# Patient Record
Sex: FEMALE | Race: BLACK OR AFRICAN AMERICAN | Employment: FULL TIME | URBAN - METROPOLITAN AREA
[De-identification: names, ages, dates, MRNs, and addresses within clinical notes are randomized per-mention and may not be internally consistent; named-entity substitution may affect disease eponyms.]

---

## 2017-02-03 ENCOUNTER — HOSPITAL ENCOUNTER (EMERGENCY)
Age: 52
Discharge: HOME | End: 2017-02-04
Attending: EMERGENCY MEDICINE
Payer: COMMERCIAL

## 2017-02-03 DIAGNOSIS — M25.551 PAIN OF RIGHT HIP JOINT: ICD-10-CM
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
## 2017-02-03 DIAGNOSIS — S50.02XA CONTUSION OF LEFT ELBOW, INITIAL ENCOUNTER: ICD-10-CM

## 2017-02-03 PROCEDURE — 99284 EMERGENCY DEPT VISIT MOD MDM: CPT

## 2017-02-03 RX ORDER — ACETAMINOPHEN 325 MG/1
650 TABLET ORAL
Status: COMPLETED | OUTPATIENT
Start: 2017-02-03 | End: 2017-02-04

## 2017-02-03 NOTE — LETTER
[address] 
[facility] EMERGENCY DEPT 
[address] [place] [address] [address] [phone] 
[phone] Work/School Note Date: 2/3/2017 To Whom It May concern: 
 
Dayana Saenz was seen and treated today in the emergency room by the following provider(s): 
Attending Provider: Cresencio Archer MD.   
 
Dayana Saenz may return to work on Sunday 2/5/17 Sincerely, Blanca Del Rio RN

## 2017-02-04 ENCOUNTER — APPOINTMENT (OUTPATIENT)
Dept: GENERAL RADIOLOGY | Age: 52
End: 2017-02-04
Attending: EMERGENCY MEDICINE
Payer: COMMERCIAL

## 2017-02-04 VITALS
HEIGHT: 69 IN | RESPIRATION RATE: 18 BRPM | TEMPERATURE: 97.9 F | BODY MASS INDEX: 42.12 KG/M2 | WEIGHT: 284.39 LBS | HEART RATE: 71 BPM | SYSTOLIC BLOOD PRESSURE: 138 MMHG | OXYGEN SATURATION: 99 % | DIASTOLIC BLOOD PRESSURE: 62 MMHG

## 2017-02-04 PROCEDURE — 73080 X-RAY EXAM OF ELBOW: CPT

## 2017-02-04 PROCEDURE — 74011250637 HC RX REV CODE- 250/637: Performed by: EMERGENCY MEDICINE

## 2017-02-04 RX ADMIN — ACETAMINOPHEN 650 MG: 325 TABLET, FILM COATED ORAL at 00:10

## 2017-02-04 NOTE — ED NOTES
Patient was at work when she tripped over some wires and fell. patent is complining of left arm and leg pain

## 2017-02-04 NOTE — DISCHARGE INSTRUCTIONS
Hip Pain: Care Instructions  Your Care Instructions  Hip pain may be caused by many things, including overuse, a fall, or a twisting movement. Another cause of hip pain is arthritis. Your pain may increase when you stand up, walk, or squat. The pain may come and go or may be constant. Home treatment can help relieve hip pain, swelling, and stiffness. If your pain is ongoing, you may need more tests and treatment. Follow-up care is a key part of your treatment and safety. Be sure to make and go to all appointments, and call your doctor if you are having problems. Its also a good idea to know your test results and keep a list of the medicines you take. How can you care for yourself at home? · Take pain medicines exactly as directed. ¨ If the doctor gave you a prescription medicine for pain, take it as prescribed. ¨ If you are not taking a prescription pain medicine, ask your doctor if you can take an over-the-counter medicine. · Rest and protect your hip. Take a break from any activity, including standing or walking, that may cause pain. · Put ice or a cold pack against your hip for 10 to 20 minutes at a time. Try to do this every 1 to 2 hours for the next 3 days (when you are awake) or until the swelling goes down. Put a thin cloth between the ice and your skin. · Sleep on your healthy side with a pillow between your knees, or sleep on your back with pillows under your knees. · If there is no swelling, you can put moist heat, a heating pad, or a warm cloth on your hip. Do gentle stretching exercises to help keep your hip flexible. · Learn how to prevent falls. Have your vision and hearing checked regularly. Wear slippers or shoes with a nonskid sole. · Stay at a healthy weight. · Wear comfortable shoes. When should you call for help? Call 911 anytime you think you may need emergency care. For example, call if:  · You have sudden chest pain and shortness of breath, or you cough up blood.   · You are not able to stand or walk or bear weight. · Your buttocks, legs, or feet feel numb or tingly. · Your leg or foot is cool or pale or changes color. · You have severe pain. Call your doctor now or seek immediate medical care if:  · You have signs of infection, such as:  ¨ Increased pain, swelling, warmth, or redness in the hip area. ¨ Red streaks leading from the hip area. ¨ Pus draining from the hip area. ¨ A fever. · You have signs of a blood clot, such as:  ¨ Pain in your calf, back of the knee, thigh, or groin. ¨ Redness and swelling in your leg or groin. · You are not able to bend, straighten, or move your leg normally. · You have trouble urinating or having bowel movements. Watch closely for changes in your health, and be sure to contact your doctor if:  · You do not get better as expected. Where can you learn more? Go to http://nickie-marleny.info/. Enter M656 in the search box to learn more about \"Hip Pain: Care Instructions. \"  Current as of: May 27, 2016  Content Version: 11.1  © 4889-2638 [employer]. Care instructions adapted under license by [employer] (which disclaims liability or warranty for this information). If you have questions about a medical condition or this instruction, always ask your healthcare professional. [place] [address] any warranty or liability for your use of this information.

## 2017-02-04 NOTE — ED PROVIDER NOTES
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 She has never injured either before. She has not taken any medication or placed ice on the affected areas prior to arrival in the ED. Pt has been able to use arm and ambulate since event. She endorses fall was entirely mechanical in nature. She has not noticed any swelling in her left elbow. Past Medical History:   Diagnosis Date    Arthritis      RA    Diabetes ([address]     Hypertension     Other ill-defined conditions(799.89)      abscesses under the right arm       Past Surgical History:   Procedure Laterality Date    Hx gyn       BTL    Pr abdomen surgery proc unlisted       Lap band         Family History:   Problem Relation Age of Onset    Diabetes Mother     Cancer Father        Social History     Social History    Marital status: LEGALLY      Spouse name: N/A    Number of children: N/A    Years of education: N/A     Occupational History    Not on file. Social History Main Topics    Smoking status: Never Smoker    Smokeless tobacco: Never Used    Alcohol use 0.5 oz/week     1 Cans of beer per week      Comment: occ    Drug use: No    Sexual activity: Yes     Partners: Male     Other Topics Concern    Not on file     Social History Narrative         ALLERGIES: Review of patient's allergies indicates no known allergies. Review of Systems   Constitutional: Negative for activity change, appetite change, fatigue and fever. HENT: Negative. Negative for congestion, rhinorrhea and sore throat. Respiratory: Negative. Negative for cough, shortness of breath and wheezing. Cardiovascular: Negative. Negative for chest pain and leg swelling. Gastrointestinal: Negative. Negative for abdominal distention, abdominal pain, constipation, diarrhea, nausea and vomiting. Endocrine: Negative.     Genitourinary: Negative for difficulty urinating, dysuria, menstrual problem, vaginal bleeding and vaginal discharge. Musculoskeletal: Positive for arthralgias. Negative for back pain, gait problem, joint swelling, myalgias, neck pain and neck stiffness. Left elbow pain  Pain over right hip/lateral right leg   Skin: Negative. Negative for rash and wound. Neurological: Negative. Negative for dizziness, syncope, weakness, light-headedness, numbness and headaches. Psychiatric/Behavioral: Negative. Vitals:    02/04/17 0034 02/04/17 0045 02/04/17 0100 02/04/17 0115   BP: 145/61 131/77 132/87 138/62   Pulse:       Resp:       Temp:       SpO2: 100% 99% 99% 99%   Weight:       Height:                Physical Exam   Constitutional: She is oriented to person, place, and time. No distress. HENT:   Head: Atraumatic. Eyes: Pupils are equal, round, and reactive to light. Neck: Normal range of motion. Neck supple. Cardiovascular: Normal rate, regular rhythm, normal heart sounds and intact distal pulses. Pulmonary/Chest: Effort normal and breath sounds normal. No respiratory distress. She has no wheezes. She has no rales. She exhibits no tenderness. Abdominal: Soft. She exhibits distension. There is no tenderness. There is no rebound. Musculoskeletal: She exhibits tenderness. She exhibits no edema or deformity. ttp over left elbow, radial head, no swelling    Very mild ttp over lateral hip, right, but FROM, no pain with internal/extrenal rotation, strength equal/symmetric BLLE   Neurological: She is alert and oriented to person, place, and time. A cranial nerve deficit is present. Skin: Skin is warm. No rash noted. No erythema. Psychiatric: She has a normal mood and affect.         MDM  Number of Diagnoses or Management Options  Contusion of left elbow, initial encounter:   Fall, initial encounter:   Pain of right hip joint:   Diagnosis management comments: Fall, contusion, sprain, strain, fracture    ED Course Procedures      Assessment       LABORATORY TESTS:  No results found for this or any previous visit (from the past 12 hour(s)). IMAGING RESULTS:  XR ELBOW LT MIN 3 V   Final Result          MEDICATIONS GIVEN:  Medications   acetaminophen (TYLENOL) tablet 650 mg (650 mg Oral Given 2/4/17 0010)       IMPRESSION:  1. Fall, initial encounter    2. Contusion of left elbow, initial encounter    3. Pain of right hip joint        PLAN:  1. Discharge Medication List as of 2/4/2017  1:25 AM      CONTINUE these medications which have NOT CHANGED    Details   amLODIPine (NORVASC) 5 mg tablet TAKE ONE TABLET BY MOUTH ONCE DAILY, Normal, Disp-30 Tab, R-0      furosemide (LASIX) 20 mg tablet TAKE ONE TABLET BY MOUTH EVERY DAY, NormalNeeds to be seenDisp-30 Tab, R-0      atenolol (TENORMIN) 100 mg tablet TAKE ONE TABLET BY MOUTH ONCE DAILY ** NEED TO MAKE APPT**, NormalShe needs to be seen ASAPDisp-30 Tab, R-0      acetaminophen (TYLENOL EXTRA STRENGTH) 500 mg tablet Take 1,000 mg by mouth daily. , Historical Med      captopril-hydrochlorothiazide (CAPOZIDE) 50-25 mg per tablet Take 1 Tab by mouth daily. , Normal, Disp-90 Tab, R-2      atorvastatin (LIPITOR) 20 mg tablet TAKE 1 TABLET BY MOUTH NIGHTLY, Normal, Disp-90 Tab, R-2      saxagliptin-metformin (KOMBIGLYZE XR) 5-1,000 mg TM24 Take 1 Tab by mouth daily (with breakfast). , Normal, Disp-30 Tab, R-3      INFLIXIMAB (REMICADE IV) by IntraVENous route., Historical Med      folic acid (FOLVITE) 1 mg tablet Take  by mouth daily. , Historical Med      diclofenac EC (VOLTAREN) 75 mg EC tablet Take 75 mg by mouth daily. , Historical Med           2.    Follow-up Information     Follow up With Details Comments Contact Info    Macy Maher MD  As needed, If symptoms worsen [email or web address]  [phone]          Return to ED if worse

## 2017-02-04 NOTE — ED NOTES
Discharge paper work given to patient by Dr. Lay Hint  patients questions and concerns answered.  Patient discharged

## 2017-08-17 ENCOUNTER — HOSPITAL ENCOUNTER (EMERGENCY)
Age: 52
Discharge: HOME | End: 2017-08-18
Attending: EMERGENCY MEDICINE
Payer: COMMERCIAL

## 2017-08-17 DIAGNOSIS — J03.90 EXUDATIVE TONSILLITIS: Primary | ICD-10-CM

## 2017-08-17 PROCEDURE — 99283 EMERGENCY DEPT VISIT LOW MDM: CPT

## 2017-08-17 RX ORDER — GLIPIZIDE 10 MG/1
5 TABLET ORAL 2 TIMES DAILY
COMMUNITY

## 2017-08-17 RX ORDER — METHOTREXATE 25 MG/ML
INJECTION INTRA-ARTERIAL; INTRAMUSCULAR; INTRATHECAL; INTRAVENOUS ONCE
COMMUNITY
End: 2018-06-21

## 2017-08-17 RX ORDER — METFORMIN HYDROCHLORIDE 1000 MG/1
TABLET ORAL
COMMUNITY
End: 2017-12-18

## 2017-08-17 NOTE — LETTER
[address] 
[facility] EMERGENCY DEPT 
[address] [place] [address] [phone] [id number] [phone] Work/School Note Date: 8/17/2017 To Whom It May concern: 
 
Jacobo Briones was seen and treated today in the emergency room by the following provider(s): 
Attending Provider: Kofi Otoole MD 
Physician Assistant: ELAINE Quan. Jacobo Briones may return to work on [address]. Sincerely, ELAINE Quan

## 2017-08-18 VITALS
WEIGHT: 275.13 LBS | TEMPERATURE: 98.2 F | HEIGHT: 69 IN | DIASTOLIC BLOOD PRESSURE: 78 MMHG | SYSTOLIC BLOOD PRESSURE: 165 MMHG | RESPIRATION RATE: 18 BRPM | HEART RATE: 78 BPM | OXYGEN SATURATION: 98 % | BODY MASS INDEX: 40.75 KG/M2

## 2017-08-18 PROCEDURE — 74011250637 HC RX REV CODE- 250/637: Performed by: PHYSICIAN ASSISTANT

## 2017-08-18 RX ORDER — DEXAMETHASONE SODIUM PHOSPHATE 4 MG/ML
10 INJECTION, SOLUTION INTRA-ARTICULAR; INTRALESIONAL; INTRAMUSCULAR; INTRAVENOUS; SOFT TISSUE
Status: COMPLETED | OUTPATIENT
Start: 2017-08-18 | End: 2017-08-18

## 2017-08-18 RX ORDER — CEPHALEXIN 500 MG/1
500 CAPSULE ORAL 3 TIMES DAILY
Qty: 30 CAP | Refills: 0 | Status: SHIPPED | OUTPATIENT
Start: 2017-08-18 | End: 2017-08-28

## 2017-08-18 RX ORDER — DEXAMETHASONE SODIUM PHOSPHATE 4 MG/ML
INJECTION, SOLUTION INTRA-ARTICULAR; INTRALESIONAL; INTRAMUSCULAR; INTRAVENOUS; SOFT TISSUE
Status: DISCONTINUED
Start: 2017-08-18 | End: 2017-08-18 | Stop reason: HOSPADM

## 2017-08-18 RX ORDER — OXYCODONE AND ACETAMINOPHEN 5; 325 MG/1; MG/1
1 TABLET ORAL
Qty: 15 TAB | Refills: 0 | Status: SHIPPED | OUTPATIENT
Start: 2017-08-18 | End: 2017-12-18

## 2017-08-18 RX ORDER — CETIRIZINE HCL 10 MG
10 TABLET ORAL DAILY
Qty: 20 TAB | Refills: 0 | Status: SHIPPED | OUTPATIENT
Start: 2017-08-18 | End: 2017-12-18

## 2017-08-18 RX ADMIN — DEXAMETHASONE SODIUM PHOSPHATE 10 MG: 4 INJECTION, SOLUTION INTRAMUSCULAR; INTRAVENOUS at 01:24

## 2017-08-18 NOTE — ED PROVIDER NOTES
HPI Comments: Reinaldo Zuleta is a 46 y.o. female, pmhx significant for HTN, rheumatoid arthritis, and DM, who presents ambulatory to the ED c/o persistent sore throat and right eye pain/redness x 1 1/2 weeks. Pt notes associated pain with swallowing. Pt was evaluated at [facility] ED for same complaints. She tested negative for strep at the time. She was started on erythromycin which she reports compliance with. However, her symptoms have not improved. Pt denies nausea, vomiting, fever, and chills. PCP: Vinay Sheffield MD    Occupational Hx:  for [employer]  PSHx: Significant for bilateral tubal ligation, abdomen surgery (lap band)  Social Hx: - tobacco, + EtOH (occasionally), - Illicit Drugs    There are no other complaints, changes, or physical findings at this time. The history is provided by the patient. No  was used. Past Medical History:   Diagnosis Date    Arthritis     RA    Diabetes ([address]     Hypertension     Other ill-defined conditions     abscesses under the right arm       Past Surgical History:   Procedure Laterality Date    ABDOMEN SURGERY PROC UNLISTED      Lap band    HX GYN      BTL         Family History:   Problem Relation Age of Onset    Diabetes Mother     Cancer Father        Social History     Social History    Marital status: LEGALLY      Spouse name: N/A    Number of children: N/A    Years of education: N/A     Occupational History    Not on file. Social History Main Topics    Smoking status: Never Smoker    Smokeless tobacco: Never Used    Alcohol use 0.5 oz/week     1 Cans of beer per week      Comment: occ    Drug use: No    Sexual activity: Yes     Partners: Male     Other Topics Concern    Not on file     Social History Narrative         ALLERGIES: Review of patient's allergies indicates no known allergies. Review of Systems   Constitutional: Negative.   Negative for activity change, appetite change, chills, diaphoresis, fever and unexpected weight change. HENT: Positive for sore throat. Negative for congestion, hearing loss, rhinorrhea, sinus pressure, sneezing and trouble swallowing. Eyes: Positive for pain and redness. Negative for itching and visual disturbance. Respiratory: Negative for cough, shortness of breath and wheezing. Cardiovascular: Negative for chest pain, palpitations and leg swelling. Gastrointestinal: Negative for abdominal pain, constipation, diarrhea, nausea and vomiting. Genitourinary: Negative for dysuria. Musculoskeletal: Negative for arthralgias, gait problem and myalgias. Skin: Negative for color change, pallor, rash and wound. Neurological: Negative for tremors, weakness, light-headedness, numbness and headaches. All other systems reviewed and are negative. Vitals:    08/17/17 2352   BP: (!) 202/88   Pulse: 88   Resp: 18   Temp: 98.2 °F (36.8 °C)   SpO2: 98%   Weight: 124.8 kg (275 lb 2.2 oz)   Height: 5' 9\" (1.753 m)            Physical Exam   Constitutional: She is oriented to person, place, and time. She appears well-developed and well-nourished. No distress. HENT:   Head: Normocephalic and atraumatic. 1+ symmetrical tonsils with erythema and exudate   Eyes: Conjunctivae and EOM are normal. Pupils are equal, round, and reactive to light. Right eye exhibits no discharge. Left eye exhibits no discharge. Neck: Normal range of motion. Neck supple. Cardiovascular: Normal rate, regular rhythm and intact distal pulses. Exam reveals no gallop and no friction rub. No murmur heard. Pulmonary/Chest: Effort normal and breath sounds normal. No respiratory distress. She has no wheezes. She has no rales. She exhibits no tenderness. Abdominal: Soft. Bowel sounds are normal. She exhibits no distension and no mass. There is no tenderness. There is no rebound and no guarding. Musculoskeletal: Normal range of motion. She exhibits no edema.    Lymphadenopathy:     She has no cervical adenopathy. Neurological: She is alert and oriented to person, place, and time. No cranial nerve deficit. Coordination normal.   Skin: Skin is warm and dry. No rash noted. No erythema. Psychiatric: She has a normal mood and affect. Nursing note and vitals reviewed. MDM  Number of Diagnoses or Management Options  Exudative tonsillitis:   Diagnosis management comments:          Amount and/or Complexity of Data Reviewed  Review and summarize past medical records: yes    Patient Progress  Patient progress: stable    ED Course       Procedures    Progress Note:  1:14 AM  Discussed workup results/findings, and counseled pt regarding her diagnosis. Pt has been encouraged to follow-up as instructed. All questions have been answered, and pt conveyed understanding. Written by Geneva Vargas, ED Scribe, as dictated by Nancy Barreto. MEDICATIONS GIVEN:  Medications   dexamethasone (DECADRON) 4 mg/mL injection 10 mg (not administered)       IMPRESSION:  1. Exudative tonsillitis        PLAN:  1. Discharge home. Current Discharge Medication List      START taking these medications    Details   oxyCODONE-acetaminophen (PERCOCET) 5-325 mg per tablet Take 1 Tab by mouth every four (4) hours as needed for Pain. Max Daily Amount: 6 Tabs. Qty: 15 Tab, Refills: 0      cephALEXin (KEFLEX) 500 mg capsule Take 1 Cap by mouth three (3) times daily for 10 days. Qty: 30 Cap, Refills: 0      cetirizine (ZYRTEC) 10 mg tablet Take 1 Tab by mouth daily. Qty: 20 Tab, Refills: 0           2. Follow-up Information     Follow up With Details Comments Contact Info    ABUNDIO Iglesias MD Schedule an appointment as soon as possible for a visit As needed [address] [facility] [phone]          3. Return to ED if worse       DISCHARGE NOTE:  1:14 AM  Patient's results have been reviewed with them.  Patient and/or family have verbally conveyed their understanding and agreement of the patient's signs, symptoms, diagnosis, treatment and prognosis and additionally agree to follow up as recommended or return to the Emergency Room should their condition change prior to follow-up. Discharge instructions have also been provided to the patient with some educational information regarding their diagnosis as well a list of reasons why they would want to return to the ER prior to their follow-up appointment should their condition change. This note is prepared by Cherri Hopson, acting as Scribe for MENDOZA Gamino: The scribe's documentation has been prepared under my direction and personally reviewed by me in its entirety. I confirm that the note above accurately reflects all work, treatment, procedures, and medical decision making performed by me.

## 2017-08-18 NOTE — ED NOTES
ELAINE Cruz at bedside to provide discharge paperwork. Vital signs stable. Pt in no apparent distress at this time. Mental status at baseline. Ambulatory to waiting room with steady gate, discharge paperwork in hand. Accompanied by self.

## 2017-08-18 NOTE — DISCHARGE INSTRUCTIONS
Thank you for allowing us to provide you with care today. We hope we addressed all of your concerns and needs. We strive to provide excellent quality care in the Emergency Department. Please rate us as excellent, as anything less than excellent does not meet our expectations. If you feel that you have not received excellent quality care or timely care, please ask to speak to the nurse manager. Please choose us in the future for your continued health care needs. The exam and treatment you received in the Emergency Department were for an urgent problem and are not intended as complete care. It is important that you follow-up with a doctor, nurse practitioner, or  [id number] assistant to: (1) confirm your diagnosis, (2) re-evaluation of changes in your illness and treatment, and (3) for ongoing care. If your symptoms become worse or you do not improve as expected and you are unable to reach your usual health care provider, you should return to the Emergency Department. We are available 24 hours a day. Take this sheet with you when you go to your follow-up visit. If you have any problem arranging the follow-up visit, contact the Emergency Department immediately. Make an appointment with your Primary Care doctor for follow up of this visit. Return to the ER if you are unable to be seen in the time recommended on your discharge instructions.

## 2017-12-18 ENCOUNTER — APPOINTMENT (OUTPATIENT)
Dept: GENERAL RADIOLOGY | Age: 52
End: 2017-12-18
Attending: EMERGENCY MEDICINE
Payer: COMMERCIAL

## 2017-12-18 ENCOUNTER — HOSPITAL ENCOUNTER (EMERGENCY)
Age: 52
Discharge: HOME | End: 2017-12-18
Attending: EMERGENCY MEDICINE
Payer: COMMERCIAL

## 2017-12-18 VITALS
BODY MASS INDEX: 39.8 KG/M2 | HEART RATE: 56 BPM | OXYGEN SATURATION: 98 % | TEMPERATURE: 98.1 F | HEIGHT: 69 IN | SYSTOLIC BLOOD PRESSURE: 162 MMHG | DIASTOLIC BLOOD PRESSURE: 76 MMHG | WEIGHT: 268.74 LBS | RESPIRATION RATE: 14 BRPM

## 2017-12-18 DIAGNOSIS — R53.83 FATIGUE, UNSPECIFIED TYPE: Primary | ICD-10-CM

## 2017-12-18 DIAGNOSIS — R55 SYNCOPE AND COLLAPSE: ICD-10-CM

## 2017-12-18 LAB
ALBUMIN SERPL-MCNC: 3.5 G/DL (ref 3.5–5)
ALBUMIN/GLOB SERPL: 1 {RATIO} (ref 1.1–2.2)
ALP SERPL-CCNC: 65 U/L (ref 45–117)
ALT SERPL-CCNC: 23 U/L (ref 12–78)
ANION GAP SERPL CALC-SCNC: 8 MMOL/L (ref 5–15)
APPEARANCE UR: ABNORMAL
AST SERPL-CCNC: 23 U/L (ref 15–37)
ATRIAL RATE: 63 BPM
BACTERIA URNS QL MICRO: ABNORMAL /HPF
BASOPHILS # BLD: 0 K/UL (ref 0–0.1)
BASOPHILS NFR BLD: 1 % (ref 0–1)
BILIRUB SERPL-MCNC: 0.8 MG/DL (ref 0.2–1)
BILIRUB UR QL: NEGATIVE
BUN SERPL-MCNC: 15 MG/DL (ref 6–20)
BUN/CREAT SERPL: 9 (ref 12–20)
CALCIUM SERPL-MCNC: 8.4 MG/DL (ref 8.5–10.1)
CALCULATED P AXIS, ECG09: 51 DEGREES
CALCULATED R AXIS, ECG10: -14 DEGREES
CALCULATED T AXIS, ECG11: 54 DEGREES
CHLORIDE SERPL-SCNC: 106 MMOL/L (ref 97–108)
CK SERPL-CCNC: 110 U/L (ref 26–192)
CO2 SERPL-SCNC: 26 MMOL/L (ref 21–32)
COLOR UR: ABNORMAL
CREAT SERPL-MCNC: 1.66 MG/DL (ref 0.55–1.02)
DIAGNOSIS, 93000: NORMAL
EOSINOPHIL # BLD: 0.1 K/UL (ref 0–0.4)
EOSINOPHIL NFR BLD: 2 % (ref 0–7)
EPITH CASTS URNS QL MICRO: ABNORMAL /LPF
ERYTHROCYTE [DISTWIDTH] IN BLOOD BY AUTOMATED COUNT: 15.6 % (ref 11.5–14.5)
GLOBULIN SER CALC-MCNC: 3.6 G/DL (ref 2–4)
GLUCOSE SERPL-MCNC: 129 MG/DL (ref 65–100)
GLUCOSE UR STRIP.AUTO-MCNC: NEGATIVE MG/DL
HCT VFR BLD AUTO: 29 % (ref 35–47)
HGB BLD-MCNC: 9.6 G/DL (ref 11.5–16)
HGB UR QL STRIP: NEGATIVE
KETONES UR QL STRIP.AUTO: NEGATIVE MG/DL
LEUKOCYTE ESTERASE UR QL STRIP.AUTO: NEGATIVE
LYMPHOCYTES # BLD: 1.3 K/UL (ref 0.8–3.5)
LYMPHOCYTES NFR BLD: 29 % (ref 12–49)
MCH RBC QN AUTO: 31.5 PG (ref 26–34)
MCHC RBC AUTO-ENTMCNC: 33.1 G/DL (ref 30–36.5)
MCV RBC AUTO: 95.1 FL (ref 80–99)
MONOCYTES # BLD: 0.8 K/UL (ref 0–1)
MONOCYTES NFR BLD: 18 % (ref 5–13)
NEUTS SEG # BLD: 2.3 K/UL (ref 1.8–8)
NEUTS SEG NFR BLD: 50 % (ref 32–75)
NITRITE UR QL STRIP.AUTO: NEGATIVE
P-R INTERVAL, ECG05: 164 MS
PH UR STRIP: 6 [PH] (ref 5–8)
PLATELET # BLD AUTO: 223 K/UL (ref 150–400)
POTASSIUM SERPL-SCNC: 3.4 MMOL/L (ref 3.5–5.1)
PROT SERPL-MCNC: 7.1 G/DL (ref 6.4–8.2)
PROT UR STRIP-MCNC: 30 MG/DL
Q-T INTERVAL, ECG07: 414 MS
QRS DURATION, ECG06: 90 MS
QTC CALCULATION (BEZET), ECG08: 423 MS
RBC # BLD AUTO: 3.05 M/UL (ref 3.8–5.2)
RBC #/AREA URNS HPF: ABNORMAL /HPF (ref 0–5)
RBC MORPH BLD: ABNORMAL
SODIUM SERPL-SCNC: 140 MMOL/L (ref 136–145)
SP GR UR REFRACTOMETRY: 1.01 (ref 1–1.03)
TROPONIN I SERPL-MCNC: <0.04 NG/ML
UA: UC IF INDICATED,UAUC: ABNORMAL
UROBILINOGEN UR QL STRIP.AUTO: 0.2 EU/DL (ref 0.2–1)
VENTRICULAR RATE, ECG03: 63 BPM
WBC # BLD AUTO: 4.5 K/UL (ref 3.6–11)
WBC MORPH BLD: ABNORMAL
WBC URNS QL MICRO: ABNORMAL /HPF (ref 0–4)

## 2017-12-18 PROCEDURE — 93005 ELECTROCARDIOGRAM TRACING: CPT

## 2017-12-18 PROCEDURE — 84484 ASSAY OF TROPONIN QUANT: CPT | Performed by: EMERGENCY MEDICINE

## 2017-12-18 PROCEDURE — 85025 COMPLETE CBC W/AUTO DIFF WBC: CPT | Performed by: EMERGENCY MEDICINE

## 2017-12-18 PROCEDURE — 87086 URINE CULTURE/COLONY COUNT: CPT | Performed by: EMERGENCY MEDICINE

## 2017-12-18 PROCEDURE — 81001 URINALYSIS AUTO W/SCOPE: CPT | Performed by: EMERGENCY MEDICINE

## 2017-12-18 PROCEDURE — 80053 COMPREHEN METABOLIC PANEL: CPT | Performed by: EMERGENCY MEDICINE

## 2017-12-18 PROCEDURE — 99284 EMERGENCY DEPT VISIT MOD MDM: CPT

## 2017-12-18 PROCEDURE — 36415 COLL VENOUS BLD VENIPUNCTURE: CPT | Performed by: EMERGENCY MEDICINE

## 2017-12-18 PROCEDURE — 82550 ASSAY OF CK (CPK): CPT | Performed by: EMERGENCY MEDICINE

## 2017-12-18 PROCEDURE — 71020 XR CHEST PA LAT: CPT

## 2017-12-18 RX ORDER — LOSARTAN POTASSIUM AND HYDROCHLOROTHIAZIDE 25; 100 MG/1; MG/1
1 TABLET ORAL DAILY
COMMUNITY
End: 2019-06-23

## 2017-12-18 NOTE — ED NOTES
Ambulated pt around ED. Pt tolerated well. Ambulated with steady gait and denies cp/sob/dizziness. Asked pt if she has been under increased stress recently. Pt became immediately tearful, stating that she and her  have . Pt states that she has loss of appetite and has not been sleeping well. Reports that she does have a support system. Sister and daughter are very supportive.

## 2017-12-18 NOTE — ED PROVIDER NOTES
(VOLTAREN) 75 mg EC tablet Take 50 mg by mouth daily.  INFLIXIMAB (REMICADE IV) by IntraVENous route.  methotrexate, PF, 25 mg/mL injection once.  acetaminophen (TYLENOL EXTRA STRENGTH) 500 mg tablet Take 1,000 mg by mouth daily. Past History     Past Medical History:  Past Medical History:   Diagnosis Date    Arthritis     RA    Diabetes ([address]     Hypertension     Other ill-defined conditions(799.89)     abscesses under the right arm       Past Surgical History:  Past Surgical History:   Procedure Laterality Date    ABDOMEN SURGERY PROC UNLISTED      Lap band    HX GYN      BTL       Family History:  Family History   Problem Relation Age of Onset    Diabetes Mother     Cancer Father        Social History:  Social History   Substance Use Topics    Smoking status: Never Smoker    Smokeless tobacco: Never Used    Alcohol use 0.5 oz/week     1 Cans of beer per week      Comment: occ       Allergies:  No Known Allergies      Review of Systems   Review of Systems   Constitutional: Positive for appetite change (decreased), chills and diaphoresis. Negative for fatigue and fever. HENT: Negative for congestion, rhinorrhea and sore throat. Eyes: Negative for pain, discharge and visual disturbance. Respiratory: Positive for cough (with green sputum). Negative for chest tightness, shortness of breath and wheezing. Cardiovascular: Negative for chest pain, palpitations and leg swelling. Gastrointestinal: Negative for abdominal pain, constipation, diarrhea, nausea and vomiting. Genitourinary: Negative for dysuria, frequency and hematuria. Musculoskeletal: Negative for arthralgias, back pain and myalgias. Skin: Negative for rash. Neurological: Positive for syncope and weakness. Negative for dizziness, light-headedness and headaches. Psychiatric/Behavioral: Negative. Physical Exam   Physical Exam   Constitutional: She is oriented to person, place, and time.  She appears well-developed and well-nourished. No distress. HENT:   Head: Normocephalic and atraumatic. Eyes: EOM are normal. Right eye exhibits no discharge. Left eye exhibits no discharge. No scleral icterus. Neck: Normal range of motion. Neck supple. No tracheal deviation present. Cardiovascular: Normal rate, regular rhythm, normal heart sounds and intact distal pulses. Exam reveals no gallop and no friction rub. No murmur heard. Pulmonary/Chest: Effort normal and breath sounds normal. No respiratory distress. She has no wheezes. She has no rales. Abdominal: Soft. She exhibits no distension. There is no tenderness. Musculoskeletal: Normal range of motion. She exhibits no edema. Lymphadenopathy:     She has no cervical adenopathy. Neurological: She is alert and oriented to person, place, and time. No focal neuro deficits   Skin: Skin is warm and dry. No rash noted. Psychiatric: She has a normal mood and affect. Nursing note and vitals reviewed.         Diagnostic Study Results     Labs -     Recent Results (from the past 12 hour(s))   EKG, 12 LEAD, INITIAL    Collection Time: 12/18/17 11:07 AM   Result Value Ref Range    Ventricular Rate 63 BPM    Atrial Rate 63 BPM    P-R Interval 164 ms    QRS Duration 90 ms    Q-T Interval 414 ms    QTC Calculation (Bezet) 423 ms    Calculated P Axis 51 degrees    Calculated R Axis -14 degrees    Calculated T Axis 54 degrees    Diagnosis       Normal sinus rhythm  Low voltage QRS  Cannot rule out Anterior infarct , age undetermined     METABOLIC PANEL, COMPREHENSIVE    Collection Time: 12/18/17 11:17 AM   Result Value Ref Range    Sodium 140 136 - 145 mmol/L    Potassium 3.4 (L) 3.5 - 5.1 mmol/L    Chloride 106 97 - 108 mmol/L    CO2 26 21 - 32 mmol/L    Anion gap 8 5 - 15 mmol/L    Glucose 129 (H) 65 - 100 mg/dL    BUN 15 6 - 20 MG/DL    Creatinine 1.66 (H) 0.55 - 1.02 MG/DL    BUN/Creatinine ratio 9 (L) 12 - 20      GFR est AA 39 (L) >60 ml/min/1.73m2    GFR est non-AA 32 (L) >60 ml/min/1.73m2    Calcium 8.4 (L) 8.5 - 10.1 MG/DL    Bilirubin, total 0.8 0.2 - 1.0 MG/DL    ALT (SGPT) 23 12 - 78 U/L    AST (SGOT) 23 15 - 37 U/L    Alk. phosphatase 65 45 - 117 U/L    Protein, total 7.1 6.4 - 8.2 g/dL    Albumin 3.5 3.5 - 5.0 g/dL    Globulin 3.6 2.0 - 4.0 g/dL    A-G Ratio 1.0 (L) 1.1 - 2.2     CBC WITH AUTOMATED DIFF    Collection Time: 12/18/17 11:17 AM   Result Value Ref Range    WBC 4.5 3.6 - 11.0 K/uL    RBC 3.05 (L) 3.80 - 5.20 M/uL    HGB 9.6 (L) 11.5 - 16.0 g/dL    HCT 29.0 (L) 35.0 - 47.0 %    MCV 95.1 80.0 - 99.0 FL    MCH 31.5 26.0 - 34.0 PG    MCHC 33.1 30.0 - 36.5 g/dL    RDW 15.6 (H) 11.5 - 14.5 %    PLATELET 380 785 - 262 K/uL    NEUTROPHILS 50 32 - 75 %    LYMPHOCYTES 29 12 - 49 %    MONOCYTES 18 (H) 5 - 13 %    EOSINOPHILS 2 0 - 7 %    BASOPHILS 1 0 - 1 %    ABS. NEUTROPHILS 2.3 1.8 - 8.0 K/UL    ABS. LYMPHOCYTES 1.3 0.8 - 3.5 K/UL    ABS. MONOCYTES 0.8 0.0 - 1.0 K/UL    ABS. EOSINOPHILS 0.1 0.0 - 0.4 K/UL    ABS.  BASOPHILS 0.0 0.0 - 0.1 K/UL    RBC COMMENTS NORMOCYTIC, NORMOCHROMIC      WBC COMMENTS REACTIVE LYMPHS     TROPONIN I    Collection Time: 12/18/17 11:17 AM   Result Value Ref Range    Troponin-I, Qt. <0.04 <0.05 ng/mL   CK W/ REFLX CKMB    Collection Time: 12/18/17 11:17 AM   Result Value Ref Range     26 - 192 U/L   URINALYSIS W/ REFLEX CULTURE    Collection Time: 12/18/17 12:27 PM   Result Value Ref Range    Color YELLOW/STRAW      Appearance CLOUDY (A) CLEAR      Specific gravity 1.014 1.003 - 1.030      pH (UA) 6.0 5.0 - 8.0      Protein 30 (A) NEG mg/dL    Glucose NEGATIVE  NEG mg/dL    Ketone NEGATIVE  NEG mg/dL    Bilirubin NEGATIVE  NEG      Blood NEGATIVE  NEG      Urobilinogen 0.2 0.2 - 1.0 EU/dL    Nitrites NEGATIVE  NEG      Leukocyte Esterase NEGATIVE  NEG      WBC 0-4 0 - 4 /hpf    RBC 0-5 0 - 5 /hpf    Epithelial cells MODERATE (A) FEW /lpf    Bacteria 1+ (A) NEG /hpf    UA:UC IF INDICATED URINE CULTURE ORDERED (A) CNI Radiologic Studies -     CXR Results  (Last 48 hours)               12/18/17 1406  XR CHEST PA LAT Final result    Impression:  IMPRESSION: No acute cardiopulmonary disease. Narrative: Indication:  syncope        Exam: PA and lateral views of the chest.       Direct comparison is made to prior CXR dated 1/23/2014. Findings: Cardiomediastinal silhouette is within normal limits. Lungs are clear   bilaterally. Pleural spaces are normal. Osseous structures are intact. Medical Decision Making   I am the first provider for this patient. I reviewed the vital signs, available nursing notes, past medical history, past surgical history, family history and social history. Vital Signs-Reviewed the patient's vital signs. Patient Vitals for the past 12 hrs:   Temp Pulse Resp BP SpO2   12/18/17 1315 - (!) 56 14 162/76 98 %   12/18/17 1230 - (!) 59 21 152/72 97 %   12/18/17 1100 98.1 °F (36.7 °C) 65 18 167/73 96 %       Pulse Oximetry Analysis - 97% on RA    Cardiac Monitor:   Rate: 65 bpm  Rhythm: Normal Sinus Rhythm     EKG interpretation: (Preliminary) 11:07 AM  Rhythm: normal sinus rhythm; and regular . Rate (approx.): 63; Axis: normal; AR interval: normal; QRS interval: normal ; ST/T wave: normal; Other findings: normal.  Written by Kenroy Irby ED Scribe, as dictated by Jose Dejesus MD.    Records Reviewed: Nursing Notes    Provider Notes (Medical Decision Making):   Patient presents to ED with generalized fatigue and syncopal event on 12/16 (no associated head injury). She denies chest pain, dyspnea, headache. Suspect dehydration/vasovagal syncope as patient reports recent decreased po intake. Differential includes dehydration, orthostatic hypotension, vasovagal syncope, electrolyte abnormality, REBECCA, UTI. Overall low suspicion for ACS, arrhythmia. No s/s to suggest PE. Will check labs and CXR. IVF and reevaluate. ED Course:   Initial assessment performed. The patients presenting problems have been discussed, and they are in agreement with the care plan formulated and outlined with them. I have encouraged them to ask questions as they arise throughout their visit. PROGRESS NOTE:  3:02 PM  Pt says she is going through a divorce and hasnt been eating or drinking normally and has been stressed. She denies SI or HI. Pt ambulated without difficulty/symptoms and is comfortable with being discharged. We discussed labs, including creatinine. Advised follow up with PCP for re-eval.  Discussed results, prescriptions and follow up plan with patient. Provided customary return to ED instructions. Patient expressed understanding. Rowan Garcia MD    Disposition:  DISCHARGE NOTE:  3:59 PM  Pt has been reexamined. Pt has no new complaints, changes, or physical findings. Care plan outlined and precautions discussed. All available results reviewed with pt. All medications reviewed with pt. All of pts questions and concerns addressed. Pt agrees to f/u as instructed and agrees to return to ED upon further deterioration. Pt is ready to go home. Written by Aliya Zhang ED Scribe as dictated by Adis Chiu MD    PLAN:  1. Discharge Medication List as of 12/18/2017  3:03 PM        2. Follow-up Information     Follow up With Details Comments Contact Info    ABUNDIO Iglesias MD  Please follow up with primary care provider this week to reevaluate kidney function (creatinine 1.66) [address] [facility] [address],  In 1 week Please follow up for evaluation of passing out [address]  [address]  [facility]  [phone]      \[facility] [place] [facility]\"" EMERGENCY DEPT  As needed, If symptoms worsen [address] [place] [address]  [phone]        Return to ED if worse     Diagnosis     Clinical Impression:   1. Fatigue, unspecified type    2.  Syncope and collapse Attestations:    Attestation: This note is prepared by Maria D Hicks, acting as Scribe for MD Keenan Juarez MD: The scribe's documentation has been prepared under my direction and personally reviewed by me in its entirety. I confirm that the note above accurately reflects all work, treatment, procedures, and medical decision making performed by me.

## 2017-12-18 NOTE — LETTER
[address] 
[facility] EMERGENCY DEPT 
[address] [facility] [address] [id number] [phone] Work/School Note Date: 12/18/2017 To Whom It May concern: 
 
Ashlie Leavitt was seen and treated today in the emergency room by the following provider(s): 
Attending Provider: Garett Rocha MD.   
 
Ashlie Leavitt may return to work on 12/21/2017.  
 
Sincerely, 
 
 
 
 
Garett Rocha MD

## 2017-12-18 NOTE — DISCHARGE INSTRUCTIONS
Fainting: Care Instructions  Your Care Instructions    When you faint, or pass out, you lose consciousness for a short time. A brief drop in blood flow to the brain often causes it. When you fall or lie down, more blood flows to your brain and you regain consciousness. Emotional stress, pain, or overheating-especially if you have been standing-can make you faint. In these cases, fainting is usually not serious. But fainting can be a sign of a more serious problem. Your doctor may want you to have more tests to rule out other causes. The treatment you need depends on the reason why you fainted. The doctor has checked you carefully, but problems can develop later. If you notice any problems or new symptoms, get medical treatment right away. Follow-up care is a key part of your treatment and safety. Be sure to make and go to all appointments, and call your doctor if you are having problems. It's also a good idea to know your test results and keep a list of the medicines you take. How can you care for yourself at home? · Drink plenty of fluids to prevent dehydration. If you have kidney, heart, or liver disease and have to limit fluids, talk with your doctor before you increase your fluid intake. When should you call for help? Call 911 anytime you think you may need emergency care. For example, call if:  ? · You have symptoms of a heart problem. These may include:  ¨ Chest pain or pressure. ¨ Severe trouble breathing. ¨ A fast or irregular heartbeat. ¨ Lightheadedness or sudden weakness. ¨ Coughing up pink, foamy mucus. ¨ Passing out. After you call 911, the  may tell you to chew 1 adult-strength or 2 to 4 low-dose aspirin. Wait for an ambulance. Do not try to drive yourself. ? · You have symptoms of a stroke. These may include:  ¨ Sudden numbness, tingling, weakness, or loss of movement in your face, arm, or leg, especially on only one side of your body. ¨ Sudden vision changes.   ¨ Sudden trouble speaking. ¨ Sudden confusion or trouble understanding simple statements. ¨ Sudden problems with walking or balance. ¨ A sudden, severe headache that is different from past headaches. ? · You passed out (lost consciousness) again. ? Watch closely for changes in your health, and be sure to contact your doctor if:  ? · You do not get better as expected. Where can you learn more? Go to http://nickie-marleny.info/. Enter L077 in the search box to learn more about \"Fainting: Care Instructions. \"  Current as of: March 20, 2017  Content Version: 11.4  © 3247-8615 [employer]. Care instructions adapted under license by [employer] (which disclaims liability or warranty for this information). If you have questions about a medical condition or this instruction, always ask your healthcare professional. [address] any warranty or liability for your use of this information. Fatigue: Care Instructions  Your Care Instructions    Fatigue is a feeling of tiredness, exhaustion, or lack of energy. You may feel fatigue because of too much or not enough activity. It can also come from stress, lack of sleep, boredom, and poor diet. Many medical problems, such as viral infections, can cause fatigue. Emotional problems, especially depression, are often the cause of fatigue. Fatigue is most often a symptom of another problem. Treatment for fatigue depends on the cause. For example, if you have fatigue because you have a certain health problem, treating this problem also treats your fatigue. If depression or anxiety is the cause, treatment may help. Follow-up care is a key part of your treatment and safety. Be sure to make and go to all appointments, and call your doctor if you are having problems. It's also a good idea to know your test results and keep a list of the medicines you take. How can you care for yourself at home? · Get regular exercise. But don't overdo it. Go back and forth between rest and exercise. · Get plenty of rest.  · Eat a healthy diet. Do not skip meals, especially breakfast.  · Reduce your use of caffeine, tobacco, and alcohol. Caffeine is most often found in coffee, tea, cola drinks, and chocolate. · Limit medicines that can cause fatigue. This includes tranquilizers and cold and allergy medicines. When should you call for help? Watch closely for changes in your health, and be sure to contact your doctor if:  ? · You have new symptoms such as fever or a rash. ? · Your fatigue gets worse. ? · You have been feeling down, depressed, or hopeless. Or you may have lost interest in things that you usually enjoy. ? · You are not getting better as expected. Where can you learn more? Go to http://nickie-marleny.info/. Enter Z070 in the search box to learn more about \"Fatigue: Care Instructions. \"  Current as of: March 20, 2017  Content Version: 11.4  © 8568-8760 [employer]. Care instructions adapted under license by [employer] (which disclaims liability or warranty for this information). If you have questions about a medical condition or this instruction, always ask your healthcare professional. [address] any warranty or liability for your use of this information.

## 2017-12-19 LAB
BACTERIA SPEC CULT: NORMAL
CC UR VC: NORMAL
SERVICE CMNT-IMP: NORMAL

## 2018-06-06 ENCOUNTER — HOSPITAL ENCOUNTER (EMERGENCY)
Age: 53
Discharge: HOME | End: 2018-06-06
Attending: EMERGENCY MEDICINE
Payer: COMMERCIAL

## 2018-06-06 VITALS
WEIGHT: 260 LBS | HEIGHT: 69 IN | OXYGEN SATURATION: 98 % | DIASTOLIC BLOOD PRESSURE: 56 MMHG | TEMPERATURE: 98.2 F | SYSTOLIC BLOOD PRESSURE: 175 MMHG | HEART RATE: 68 BPM | BODY MASS INDEX: 38.51 KG/M2 | RESPIRATION RATE: 16 BRPM

## 2018-06-06 DIAGNOSIS — M54.32 LEFT SCIATIC NERVE PAIN: Primary | ICD-10-CM

## 2018-06-06 PROCEDURE — 96372 THER/PROPH/DIAG INJ SC/IM: CPT

## 2018-06-06 PROCEDURE — 99283 EMERGENCY DEPT VISIT LOW MDM: CPT

## 2018-06-06 PROCEDURE — 74011250636 HC RX REV CODE- 250/636: Performed by: EMERGENCY MEDICINE

## 2018-06-06 PROCEDURE — 74011250637 HC RX REV CODE- 250/637: Performed by: EMERGENCY MEDICINE

## 2018-06-06 RX ORDER — METHYLPREDNISOLONE 4 MG/1
TABLET ORAL
Qty: 1 DOSE PACK | Refills: 0 | Status: SHIPPED | OUTPATIENT
Start: 2018-06-06 | End: 2018-06-21

## 2018-06-06 RX ORDER — LIDOCAINE 4 G/100G
1 PATCH TOPICAL EVERY 12 HOURS
Qty: 5 PATCH | Refills: 0 | Status: SHIPPED | OUTPATIENT
Start: 2018-06-06 | End: 2018-06-21

## 2018-06-06 RX ORDER — CYCLOBENZAPRINE HCL 5 MG
5 TABLET ORAL
Qty: 20 TAB | Refills: 0 | Status: SHIPPED | OUTPATIENT
Start: 2018-06-06 | End: 2019-04-10

## 2018-06-06 RX ORDER — CYCLOBENZAPRINE HCL 10 MG
10 TABLET ORAL
Status: COMPLETED | OUTPATIENT
Start: 2018-06-06 | End: 2018-06-06

## 2018-06-06 RX ORDER — CYCLOBENZAPRINE HCL 5 MG
5 TABLET ORAL
Qty: 20 TAB | Refills: 0 | Status: SHIPPED | OUTPATIENT
Start: 2018-06-06 | End: 2018-06-06

## 2018-06-06 RX ORDER — METHYLPREDNISOLONE 4 MG/1
TABLET ORAL
Qty: 1 DOSE PACK | Refills: 0 | Status: SHIPPED | OUTPATIENT
Start: 2018-06-06 | End: 2018-06-06

## 2018-06-06 RX ORDER — TRAMADOL HYDROCHLORIDE 50 MG/1
50 TABLET ORAL
COMMUNITY
End: 2018-07-04

## 2018-06-06 RX ORDER — LIDOCAINE 4 G/100G
1 PATCH TOPICAL EVERY 12 HOURS
Qty: 5 PATCH | Refills: 0 | Status: SHIPPED | OUTPATIENT
Start: 2018-06-06 | End: 2018-06-06

## 2018-06-06 RX ORDER — LIDOCAINE 50 MG/G
1 PATCH TOPICAL EVERY 24 HOURS
Status: DISCONTINUED | OUTPATIENT
Start: 2018-06-06 | End: 2018-06-06 | Stop reason: HOSPADM

## 2018-06-06 RX ORDER — PREDNISONE 10 MG/1
10 TABLET ORAL
COMMUNITY
End: 2018-07-04

## 2018-06-06 RX ORDER — MORPHINE SULFATE 10 MG/ML
6 INJECTION, SOLUTION INTRAMUSCULAR; INTRAVENOUS
Status: COMPLETED | OUTPATIENT
Start: 2018-06-06 | End: 2018-06-06

## 2018-06-06 RX ADMIN — MORPHINE SULFATE 6 MG: 10 INJECTION INTRAVENOUS at 08:09

## 2018-06-06 RX ADMIN — CYCLOBENZAPRINE HYDROCHLORIDE 10 MG: 10 TABLET, FILM COATED ORAL at 07:16

## 2018-06-06 RX ADMIN — METHYLPREDNISOLONE SODIUM SUCCINATE 125 MG: 125 INJECTION, POWDER, FOR SOLUTION INTRAMUSCULAR; INTRAVENOUS at 07:16

## 2018-06-06 NOTE — ED NOTES
0715: Assumed care of patient. Bedside report received from Karla Dumont. Pt currently resting in position of comfort. 0815: Pt states her family members are en route from [facility]. Will medicate per MD orders. Pt given ice water and arabella crackers.  Assisted to position of comfort

## 2018-06-06 NOTE — ED TRIAGE NOTES
Lower back pain that has been radiating down her left leg. Scheduled for left hip replacement on 07/02/2018 so unable to take any medications that would raise her HgbA1C. Has tried Tylenol and Tramadol at home without relief.

## 2018-06-06 NOTE — ED NOTES
0800: Discharge instructions reviewed with pt by Dr. Jaxon Corea. Pt able to return/verbalize discharge instructions. Copy of discharge instructions given. RX given to patient. Patient condition stable, respiratory status within normal limits, neuro status intact. 3139: family member at bedside to take patient home.  Pt discharged in wheelchair

## 2018-06-06 NOTE — ED PROVIDER NOTES
EMERGENCY DEPARTMENT HISTORY AND PHYSICAL EXAM      Date: 6/6/2018  Patient Name: Jennifer Granados    History of Presenting Illness     Chief Complaint   Patient presents with    Back Pain     lower back pain since Saturday    Leg Pain     now radiating down left leg.  scheduled for left hip replacement since 07/02. Tylenol and Tramadol for pain without relief. History Provided By: Patient    HPI: Jennifer Granados, 45 y.o. female with PMHx significant for HTN and DM, presents ambulatory to ED [facility] ED with cc of progressively worsening, excruciating, left hip pain that radiates down her left ankle x 3 days. She reports that her pain was initially in her left lower back, but it is currently greatest in her left hip. Her pain is exacerbated with movement. She reports that she is scheduled for a left total hip arthroplasty on 7/2/2018. She is prescribed Tramadol and Tylenol, which has provided no relief. She notes a history of sciatica, but she reports current symptoms are not similar. She reports ambulating with a cane over the past 2 weeks. She specifically denies fevers, dysuria, diarrhea, constipation, nausea, vomiting, urinary/fecal incontinence, or saddle anesthesia. Chief Complaint: Hip pain  Duration: 3 Days  Timing:  Progressive and Worsening  Location: Left hip  Quality: Excruciating  Severity: N/A  Modifying Factors: Hip pain is exacerbated with movement  Associated Symptoms: Radiation to left ankle    There are no other complaints, changes, or physical findings at this time.     PCP: Junior Wing MD   Orthopedic Surgery: Marifer Ryan MD    Current Facility-Administered Medications   Medication Dose Route Frequency Provider Last Rate Last Dose    lidocaine (LIDODERM) 5 % patch 1 Patch  1 Patch TransDERmal Q24H Cipriano Dubon MD   1 Patch at 06/06/18 0717    morphine injection 6 mg  6 mg IntraMUSCular NOW Cipriano Dubon MD         Current Outpatient Prescriptions   Medication Sig Dispense Refill    predniSONE (DELTASONE) 10 mg tablet Take 10 mg by mouth daily (with breakfast).  traMADol (ULTRAM) 50 mg tablet Take 50 mg by mouth every six (6) hours as needed for Pain.  methylPREDNISolone (MEDROL, SOO,) 4 mg tablet As directed 1 Dose Pack 0    cyclobenzaprine (FLEXERIL) 5 mg tablet Take 1 Tab by mouth three (3) times daily as needed for Muscle Spasm(s). 20 Tab 0    lidocaine (ASPERCREME, LIDOCAINE,) 4 % patch 1 Patch by TransDERmal route every twelve (12) hours every twelve (12) hours. 5 Patch 0    SITagliptin-metFORMIN (JANUMET)  mg per tablet Take 1 Tab by mouth daily.  losartan-hydroCHLOROthiazide (HYZAAR) 100-25 mg per tablet Take 1 Tab by mouth daily.  glipiZIDE (GLUCOTROL) 10 mg tablet Take 5 mg by mouth daily.  methotrexate, PF, 25 mg/mL injection once.  amLODIPine (NORVASC) 5 mg tablet TAKE ONE TABLET BY MOUTH ONCE DAILY 30 Tab 0    atenolol (TENORMIN) 100 mg tablet TAKE ONE TABLET BY MOUTH ONCE DAILY ** NEED TO MAKE APPT** 30 Tab 0    acetaminophen (TYLENOL EXTRA STRENGTH) 500 mg tablet Take 1,000 mg by mouth daily.  atorvastatin (LIPITOR) 20 mg tablet TAKE 1 TABLET BY MOUTH NIGHTLY (Patient taking differently: 40 mg daily. TAKE 1 TABLET BY MOUTH NIGHTLY) 90 Tab 2    diclofenac EC (VOLTAREN) 75 mg EC tablet Take 50 mg by mouth daily.  INFLIXIMAB (REMICADE IV) by IntraVENous route.          Past History     Past Medical History:  Past Medical History:   Diagnosis Date    Arthritis     RA    Diabetes ([address]     Hypertension     Other ill-defined conditions(799.89)     abscesses under the right arm       Past Surgical History:  Past Surgical History:   Procedure Laterality Date    ABDOMEN SURGERY PROC UNLISTED      Lap band    HX GYN      BTL       Family History:  Family History   Problem Relation Age of Onset    Diabetes Mother     Cancer Father        Social History:  Social History   Substance Use Topics    Smoking status: Never Smoker    Smokeless tobacco: Never Used    Alcohol use 0.5 oz/week     1 Cans of beer per week      Comment: occ       Allergies:  No Known Allergies    Review of Systems   Review of Systems   Constitutional: Negative for chills and fever. HENT: Negative. Respiratory: Negative for cough and shortness of breath. Cardiovascular: Negative for chest pain. Gastrointestinal: Negative for constipation, diarrhea, nausea and vomiting. Genitourinary: Negative for dysuria. Musculoskeletal: Positive for arthralgias (Left hip). Skin: Negative. Neurological: Negative for weakness and numbness. Denies urinary/fecal incontinence  Denies saddle anesthesia   All other systems reviewed and are negative. Physical Exam   Physical Exam   Constitutional: She is oriented to person, place, and time. She appears well-developed and well-nourished. Pt appears uncomfortable, and she is tearful secondary to pain   HENT:   Head: Normocephalic and atraumatic. Eyes: Conjunctivae and EOM are normal.   Neck: Normal range of motion. Neck supple. Cardiovascular: Normal rate and regular rhythm. Pulmonary/Chest: Effort normal and breath sounds normal. No respiratory distress. Abdominal: Soft. She exhibits no distension. There is no tenderness. Musculoskeletal:   Tenderness to left hip, tenderness to left lateral leg overlying the sciatic nerve, SLR positive on the left, pt appears to be in distress when transitioning from sitting to supine position   Neurological: She is alert and oriented to person, place, and time. Skin: Skin is warm and dry. Psychiatric: She has a normal mood and affect. Nursing note and vitals reviewed. Medical Decision Making   I am the first provider for this patient. I reviewed the vital signs, available nursing notes, past medical history, past surgical history, family history and social history. Vital Signs-Reviewed the patient's vital signs.   Patient Vitals for the past 12 hrs:   Temp Pulse Resp BP SpO2   06/06/18 0441 98.2 °F (36.8 °C) 78 14 144/62 100 %       Records Reviewed: Nursing Notes and Old Medical Records    Provider Notes (Medical Decision Making):   Pt presents with left hip pain radiating down her left leg. Likely her chronic arthritis needing replacement with new onset of sciatica. Will provide analgesics and have her follow up with Orthopedic Surgery. No red flag symptoms. ED Course:   Initial assessment performed. The patients presenting problems have been discussed, and they are in agreement with the care plan formulated and outlined with them. I have encouraged them to ask questions as they arise throughout their visit. 6:39 AM  Tabitha Seip Young requests pain medications. Patient has an available ride present in ED and patient is aware that the provider must see their ride before discharge. Pain medications ordered. Pt informed that he/she should not drive while taking medication. Pt is in understanding. Progress Note:  7:35 AM  The patient was re-evaluated, and she reports feeling improved after ED treatment. She states that she is able to range her left leg without eliciting as much pain. Written by Louis Hopson, ED Scribe, as dictated by Long Weaver MD.    Critical Care Time: 0 minutes    Disposition:  Discharge Note:  7:46 AM  The pt is ready for discharge. The pt's signs, symptoms, diagnosis, and discharge instructions have been discussed and pt has conveyed their understanding. The pt is to follow up as recommended or return to ER should their symptoms worsen. Plan has been discussed and pt is in agreement. PLAN:  1. Current Discharge Medication List      START taking these medications    Details   methylPREDNISolone (MEDROL, SOO,) 4 mg tablet As directed  Qty: 1 Dose Pack, Refills: 0      cyclobenzaprine (FLEXERIL) 5 mg tablet Take 1 Tab by mouth three (3) times daily as needed for Muscle Spasm(s).   Qty: 20 Tab, Refills: 0      lidocaine (ASPERCREME, LIDOCAINE,) 4 % patch 1 Patch by TransDERmal route every twelve (12) hours every twelve (12) hours. Qty: 5 Patch, Refills: 0           2. Follow-up Information     Follow up With Details Comments Contact Info    Hector Callejas MD  About your nerve pain [facility] [address]  [phone]      viki  About scheduling earlier appointment         Return to ED if worse     Diagnosis     Clinical Impression:   1. Left sciatic nerve pain        Attestations: This note is prepared by Brissa Mathew, acting as a Scribe for Clem Robbins MD.    Clem Robbins MD: The scribe's documentation has been prepared under my direction and personally reviewed by me in its entirety. I confirm that the notes above accurately reflects all work, treatment, procedures, and medical decision making performed by me. This note will not be viewable in [address]

## 2018-06-21 ENCOUNTER — HOSPITAL ENCOUNTER (OUTPATIENT)
Dept: PREADMISSION TESTING | Age: 53
Discharge: HOME | End: 2018-06-21
Payer: COMMERCIAL

## 2018-06-21 VITALS
HEART RATE: 65 BPM | BODY MASS INDEX: 20.73 KG/M2 | DIASTOLIC BLOOD PRESSURE: 70 MMHG | TEMPERATURE: 98.2 F | WEIGHT: 140 LBS | HEIGHT: 69 IN | OXYGEN SATURATION: 98 % | SYSTOLIC BLOOD PRESSURE: 140 MMHG

## 2018-06-21 LAB
ABO + RH BLD: NORMAL
APPEARANCE UR: ABNORMAL
ATRIAL RATE: 68 BPM
BACTERIA URNS QL MICRO: NEGATIVE /HPF
BILIRUB UR QL: NEGATIVE
BLOOD GROUP ANTIBODIES SERPL: NORMAL
CALCULATED P AXIS, ECG09: 52 DEGREES
CALCULATED R AXIS, ECG10: -10 DEGREES
CALCULATED T AXIS, ECG11: 45 DEGREES
COLOR UR: ABNORMAL
DIAGNOSIS, 93000: NORMAL
EPITH CASTS URNS QL MICRO: ABNORMAL /LPF
EST. AVERAGE GLUCOSE BLD GHB EST-MCNC: 137 MG/DL
GLUCOSE UR STRIP.AUTO-MCNC: NEGATIVE MG/DL
HBA1C MFR BLD: 6.4 % (ref 4.2–6.3)
HGB UR QL STRIP: NEGATIVE
HYALINE CASTS URNS QL MICRO: ABNORMAL /LPF (ref 0–5)
INR PPP: 1 (ref 0.9–1.1)
KETONES UR QL STRIP.AUTO: NEGATIVE MG/DL
LEUKOCYTE ESTERASE UR QL STRIP.AUTO: NEGATIVE
MUCOUS THREADS URNS QL MICRO: ABNORMAL /LPF
NITRITE UR QL STRIP.AUTO: NEGATIVE
P-R INTERVAL, ECG05: 184 MS
PH UR STRIP: 6 [PH] (ref 5–8)
PROT UR STRIP-MCNC: NEGATIVE MG/DL
PROTHROMBIN TIME: 10.2 SEC (ref 9–11.1)
Q-T INTERVAL, ECG07: 420 MS
QRS DURATION, ECG06: 100 MS
QTC CALCULATION (BEZET), ECG08: 446 MS
RBC #/AREA URNS HPF: ABNORMAL /HPF (ref 0–5)
SP GR UR REFRACTOMETRY: 1.02 (ref 1–1.03)
SPECIMEN EXP DATE BLD: NORMAL
UA: UC IF INDICATED,UAUC: ABNORMAL
UROBILINOGEN UR QL STRIP.AUTO: 0.2 EU/DL (ref 0.2–1)
VENTRICULAR RATE, ECG03: 68 BPM
WBC URNS QL MICRO: ABNORMAL /HPF (ref 0–4)

## 2018-06-21 PROCEDURE — 36415 COLL VENOUS BLD VENIPUNCTURE: CPT | Performed by: ORTHOPAEDIC SURGERY

## 2018-06-21 PROCEDURE — 93005 ELECTROCARDIOGRAM TRACING: CPT

## 2018-06-21 PROCEDURE — 85610 PROTHROMBIN TIME: CPT | Performed by: ORTHOPAEDIC SURGERY

## 2018-06-21 PROCEDURE — 86900 BLOOD TYPING SEROLOGIC ABO: CPT | Performed by: ORTHOPAEDIC SURGERY

## 2018-06-21 PROCEDURE — 83036 HEMOGLOBIN GLYCOSYLATED A1C: CPT | Performed by: ORTHOPAEDIC SURGERY

## 2018-06-21 PROCEDURE — 81001 URINALYSIS AUTO W/SCOPE: CPT | Performed by: ORTHOPAEDIC SURGERY

## 2018-06-21 RX ORDER — ATORVASTATIN CALCIUM 40 MG/1
40 TABLET, FILM COATED ORAL
COMMUNITY

## 2018-06-21 RX ORDER — METHOTREXATE 2.5 MG/1
TABLET ORAL
COMMUNITY
End: 2018-07-04

## 2018-06-21 NOTE — PERIOP NOTES
PREOPERATIVE INSTRUCTIONS REVIEWED WITH PATIENT. PATIENT GIVEN 2 PACKs OF CHG WIPES. INSTRUCTIONS REVIEWED IN CLASS ON USE OF CHG WIPES. PATIENT GIVEN SSI INFECTION SHEET. MRSA/MSSA TREATMENT INSTRUCTION SHEET GIVEN WITH AN EXPLANATION TO PATIENT THAT THEY WILL BE NOTIFIED IF TREATMENT INSTRUCTIONS NEED TO BE INITIATED. PATIENT WAS GIVEN THE  OPPORTUNITY TO ASK QUESTIONS ON THE INFORMATION PROVIDED.

## 2018-06-22 ENCOUNTER — HOSPITAL ENCOUNTER (OUTPATIENT)
Dept: PREADMISSION TESTING | Age: 53
Discharge: HOME | End: 2018-06-22

## 2018-06-23 LAB
BACTERIA SPEC CULT: ABNORMAL
BACTERIA SPEC CULT: ABNORMAL
SERVICE CMNT-IMP: ABNORMAL

## 2018-06-25 RX ORDER — MUPIROCIN 20 MG/G
OINTMENT TOPICAL 2 TIMES DAILY
Qty: 22 G | Refills: 0 | Status: SHIPPED | OUTPATIENT
Start: 2018-06-25 | End: 2018-07-04

## 2018-06-26 NOTE — PERIOP NOTES
6/21/18- PREOPERATIVE INSTRUCTIONS REVIEWED WITH PATIENT. PATIENT GIVEN 2 PACKS OF CHG WIPES. INSTRUCTIONS REVIEWED IN CLASS ON USE OF CHG WIPES. PATIENT GIVEN SSI INFECTION SHEET. MRSA/MSSA TREATMENT INSTRUCTION SHEET GIVEN WITH AN EXPLANATION TO PATIENT THAT THEY WILL BE NOTIFIED IF TREATMENT INSTRUCTIONS NEED TO BE INITIATED. PATIENT WAS GIVEN THE  OPPORTUNITY TO ASK QUESTIONS ON THE INFORMATION PROVIDED.    +MSSA RESULTS CALLED TO WM NURSE TO DR. LOWE, & RESULTS FAXED TO OFFICE.  CHART WAS HANDED TO WHITNEY BLAKE/SOILA, PER PROTOCOL

## 2018-06-29 ENCOUNTER — ANESTHESIA EVENT (OUTPATIENT)
Dept: SURGERY | Age: 53
DRG: 470 | End: 2018-06-29
Payer: COMMERCIAL

## 2018-06-29 NOTE — H&P
[place]ia [place] Young  Location: [place] [address] [address]  Patient #: [id number]  : 1965   / Language: English / Race: Black or   Female      History of Present Illness   The patient is a 45year old female who presents with a complaint of Hip Pain. The onset of the hip pain has been gradual and has been occurring in a persistent pattern for 1 year. The course has been gradually worsening. The hip pain is described as a severe sharp stabbing. The hip pain is described as being located in the hip (left hip). The pain is aggravated by walking, climbing stairs, lying on affected side, lifting, bending and squatting. Relieving factors include rest and heat. Note for \"Hip Pain\": Patient's chief complaint is left hip pain.  I have followed her for one year.  She's gotten to the point where she can no longer walk. Shaq Obando is using a cane.  Last office visit showed severe DJD on the x-rays.  She states just can't handle this anymore. Problem List/Past Medical  REVIEW OF SYSTEMS: Systems were reviewed by the provider.    Weight above 97th percentile (V49.89  Z78.9)    Primary osteoarthritis of left hip (715.15  M16.12)      Allergies  No Known Allergies  [2017]:  No Known Drug Allergies  [2017]: Allergies Reconciled      Family History   Cancer   Father, Sister. Diabetes Mellitus   Mother, Sister. Hypertension   Father, Mother, Sister. Social History  Alcohol use   1-2 drinks per occasion, 7 or more times, Drinks beer, Never drinks more than 5 drinks per occasion, per week. Caffeine use   3-4 drinks per day, Coffee. Current work status   Full-time. Exercise   Inactive. Marital status   . No drug use    Seat Belt Use   Always uses seat belts. Sun Exposure   Rarely. Tobacco / smoke exposure   None. Tobacco use   Never smoker. Medication History   TraMADol HCl  (Oral) Specific strength unknown - Active.   Atorvastatin Calcium  (Oral) Specific strength unknown - Active. Atenolol  (Oral) Specific strength unknown - Active. GlipiZIDE  (Oral) Specific strength unknown - Active. Methotrexate  (Oral) Specific strength unknown - Active. Kombiglyze XR  (Oral) Specific strength unknown - Active. Losartan Potassium  (Oral) Specific strength unknown - Active. Amlodipine Besy-Benazepril HCl  (Oral) Specific strength unknown - Active. PredniSONE  (10MG Tablet, Oral) Active. Medications Reconciled     Pregnancy / Birth History  Pregnant   No.    Past Surgical History   Hysterectomy   non-cancerous: complete and abdominal  Tubal Ligation    Weight Loss Surgery   Banding    Other Problems   Arthritis    Diabetes Mellitus    High blood pressure    Hypercholesterolemia          Review of Systems   General Not Present- Chills and Fatigue. Skin Not Present- Bruising, Pallor and Skin Color Changes. Respiratory Not Present- Cough and Difficulty Breathing. Cardiovascular Not Present- Chest Pain, Fainting / Blacking Out and Rapid Heart Rate. Musculoskeletal Not Present- Decreased Range of Motion, Joint Pain and Joint Swelling. Neurological Not Present- Dysesthesia, Paresthesias and Weakness In Extremities. Hematology Not Present- Abnormal Bleeding, Blood Clots and Petechiae. Vitals   Weight: 265 lb   Height: 69 in   Weight was reported by patient. Height was reported by patient. Body Surface Area: 2.33 m²   Body Mass Index: 39.13 kg/m²          Physical Exam   Musculoskeletal  Global Assessment  Examination of related systems reveals - well-developed, well-nourished, in no acute distress, alert and oriented x 3. Left Lower Extremity - Note: Patient's hip was examined. A 5° flexion contracture. Hip flexes to 90°. No rotation. For progression angle is 10° external when she attempts to walk with her cane. Straight leg raise and femoral nerve stretch test are negative. Extremity is sensate and perfused with palpable dorsalis pedis pulse.  Hip flexors, quads, ankle plantar flexors, ankle dorsiflexors have 5 out of 5 strength. Assessment & Plan   Primary osteoarthritis of left hip (715.15  M16.12)  Impression: Patient has done very well with her diabetes. Everything seems to be coming together medically. She is now a candidate for hip replacement. Examined her hip closely and she is not a candidate for an anterior approach secondary to large overhanging pannus and thin skin in her groin. Current Plans  Pt Education - How to access health information online: discussed with patient and provided information. Pt Education - Educational materials were provided.: discussed with patient and provided information. X-RAY EXAM OF HIP COMPLETE min 2 VIEWS (30099) (left 2 views of the left hip. She has a fixed external rotation and flexion contracture. The femoral head has collapsed.  She has severe bone-on-bone with large osteophytes.)  Weight above 97th percentile (V49.89  Z78.9)  Current Plans  LIFESTYLE EDUCATION REGARDING DIET (11483)  REVIEW OF SYSTEMS: Systems were reviewed by the provider.(V49.9)        Signed by Nigel Nevarez MD

## 2018-07-02 ENCOUNTER — ANESTHESIA (OUTPATIENT)
Dept: SURGERY | Age: 53
DRG: 470 | End: 2018-07-02
Payer: COMMERCIAL

## 2018-07-02 ENCOUNTER — HOSPITAL ENCOUNTER (INPATIENT)
Age: 53
LOS: 2 days | Discharge: HOME HEALTH | DRG: 470 | End: 2018-07-04
Attending: ORTHOPAEDIC SURGERY | Admitting: ORTHOPAEDIC SURGERY
Payer: COMMERCIAL

## 2018-07-02 ENCOUNTER — APPOINTMENT (OUTPATIENT)
Dept: GENERAL RADIOLOGY | Age: 53
DRG: 470 | End: 2018-07-02
Attending: PHYSICIAN ASSISTANT
Payer: COMMERCIAL

## 2018-07-02 DIAGNOSIS — M16.12 ARTHRITIS OF LEFT HIP: Primary | ICD-10-CM

## 2018-07-02 LAB
GLUCOSE BLD STRIP.AUTO-MCNC: 110 MG/DL (ref 65–100)
GLUCOSE BLD STRIP.AUTO-MCNC: 135 MG/DL (ref 65–100)
GLUCOSE BLD STRIP.AUTO-MCNC: 63 MG/DL (ref 65–100)
GLUCOSE BLD STRIP.AUTO-MCNC: 64 MG/DL (ref 65–100)
GLUCOSE BLD STRIP.AUTO-MCNC: 85 MG/DL (ref 65–100)
GLUCOSE BLD STRIP.AUTO-MCNC: 96 MG/DL (ref 65–100)
SERVICE CMNT-IMP: ABNORMAL
SERVICE CMNT-IMP: NORMAL
SERVICE CMNT-IMP: NORMAL

## 2018-07-02 PROCEDURE — 77030034850: Performed by: ORTHOPAEDIC SURGERY

## 2018-07-02 PROCEDURE — 77030011640 HC PAD GRND REM COVD -A: Performed by: ORTHOPAEDIC SURGERY

## 2018-07-02 PROCEDURE — 77030008467 HC STPLR SKN COVD -B: Performed by: ORTHOPAEDIC SURGERY

## 2018-07-02 PROCEDURE — 77030020788: Performed by: ORTHOPAEDIC SURGERY

## 2018-07-02 PROCEDURE — 77030012935 HC DRSG AQUACEL BMS -B: Performed by: ORTHOPAEDIC SURGERY

## 2018-07-02 PROCEDURE — 74011250637 HC RX REV CODE- 250/637: Performed by: ORTHOPAEDIC SURGERY

## 2018-07-02 PROCEDURE — 76210000017 HC OR PH I REC 1.5 TO 2 HR: Performed by: ORTHOPAEDIC SURGERY

## 2018-07-02 PROCEDURE — 76060000036 HC ANESTHESIA 2.5 TO 3 HR: Performed by: ORTHOPAEDIC SURGERY

## 2018-07-02 PROCEDURE — 73501 X-RAY EXAM HIP UNI 1 VIEW: CPT

## 2018-07-02 PROCEDURE — 0SRB04A REPLACEMENT OF LEFT HIP JOINT WITH CERAMIC ON POLYETHYLENE SYNTHETIC SUBSTITUTE, UNCEMENTED, OPEN APPROACH: ICD-10-PCS | Performed by: ORTHOPAEDIC SURGERY

## 2018-07-02 PROCEDURE — 97161 PT EVAL LOW COMPLEX 20 MIN: CPT

## 2018-07-02 PROCEDURE — 74011250636 HC RX REV CODE- 250/636: Performed by: ORTHOPAEDIC SURGERY

## 2018-07-02 PROCEDURE — 77030018836 HC SOL IRR NACL ICUM -A: Performed by: ORTHOPAEDIC SURGERY

## 2018-07-02 PROCEDURE — 74011250637 HC RX REV CODE- 250/637: Performed by: PHYSICIAN ASSISTANT

## 2018-07-02 PROCEDURE — 77030027138 HC INCENT SPIROMETER -A

## 2018-07-02 PROCEDURE — 77030020782 HC GWN BAIR PAWS FLX 3M -B

## 2018-07-02 PROCEDURE — 74011250636 HC RX REV CODE- 250/636: Performed by: PHYSICIAN ASSISTANT

## 2018-07-02 PROCEDURE — 74011000258 HC RX REV CODE- 258

## 2018-07-02 PROCEDURE — 74011250636 HC RX REV CODE- 250/636

## 2018-07-02 PROCEDURE — C1776 JOINT DEVICE (IMPLANTABLE): HCPCS | Performed by: ORTHOPAEDIC SURGERY

## 2018-07-02 PROCEDURE — 77030006783 HC BLD SAW OSC MCRA -A: Performed by: ORTHOPAEDIC SURGERY

## 2018-07-02 PROCEDURE — 77030018822 HC SLV COMPR FT COVD -B

## 2018-07-02 PROCEDURE — 82962 GLUCOSE BLOOD TEST: CPT

## 2018-07-02 PROCEDURE — 97116 GAIT TRAINING THERAPY: CPT

## 2018-07-02 PROCEDURE — 74011000250 HC RX REV CODE- 250

## 2018-07-02 PROCEDURE — 77030032490 HC SLV COMPR SCD KNE COVD -B

## 2018-07-02 PROCEDURE — 77030013079 HC BLNKT BAIR HGGR 3M -A: Performed by: ANESTHESIOLOGY

## 2018-07-02 PROCEDURE — 77030031139 HC SUT VCRL2 J&J -A: Performed by: ORTHOPAEDIC SURGERY

## 2018-07-02 PROCEDURE — 74011000250 HC RX REV CODE- 250: Performed by: ORTHOPAEDIC SURGERY

## 2018-07-02 PROCEDURE — 65270000029 HC RM PRIVATE

## 2018-07-02 PROCEDURE — 74011250636 HC RX REV CODE- 250/636: Performed by: ANESTHESIOLOGY

## 2018-07-02 PROCEDURE — 76010000172 HC OR TIME 2.5 TO 3 HR INTENSV-TIER 1: Performed by: ORTHOPAEDIC SURGERY

## 2018-07-02 PROCEDURE — 77030018846 HC SOL IRR STRL H20 ICUM -A: Performed by: ORTHOPAEDIC SURGERY

## 2018-07-02 PROCEDURE — C9290 INJ, BUPIVACAINE LIPOSOME: HCPCS | Performed by: ORTHOPAEDIC SURGERY

## 2018-07-02 DEVICE — CUP ACET DIA52MM HIP GRIPTION PRI CEMENTLESS FIX SECT SER: Type: IMPLANTABLE DEVICE | Site: HIP | Status: FUNCTIONAL

## 2018-07-02 DEVICE — LINER ACET OD52MM ID36MM HIP ALTRX PINN: Type: IMPLANTABLE DEVICE | Site: HIP | Status: FUNCTIONAL

## 2018-07-02 DEVICE — SCREW BNE L25MM DIA6.5MM CANC HIP S STL GRIPTION FULL THRD: Type: IMPLANTABLE DEVICE | Site: HIP | Status: FUNCTIONAL

## 2018-07-02 DEVICE — COMPONENT TOT HIP PRIMARY CERM ALTRX: Type: IMPLANTABLE DEVICE | Site: HIP | Status: FUNCTIONAL

## 2018-07-02 DEVICE — ELIMINATOR H APEX FOR 48-60MM PINN HIP SHELL: Type: IMPLANTABLE DEVICE | Site: HIP | Status: FUNCTIONAL

## 2018-07-02 DEVICE — STEM FEM SZ 6 L150MM 130DEG STD OFFSET CALCAR HIP BILAT TI: Type: IMPLANTABLE DEVICE | Site: HIP | Status: FUNCTIONAL

## 2018-07-02 DEVICE — SCREW BNE L35MM DIA6.5MM CANC HIP DOME PINN: Type: IMPLANTABLE DEVICE | Site: HIP | Status: FUNCTIONAL

## 2018-07-02 DEVICE — HEAD FEM DIA36MM +5MM OFFSET 12/14 TAPR HIP CERAMIC BIOLOX: Type: IMPLANTABLE DEVICE | Site: HIP | Status: FUNCTIONAL

## 2018-07-02 RX ORDER — SODIUM CHLORIDE 0.9 % (FLUSH) 0.9 %
5-10 SYRINGE (ML) INJECTION EVERY 8 HOURS
Status: DISCONTINUED | OUTPATIENT
Start: 2018-07-02 | End: 2018-07-02 | Stop reason: HOSPADM

## 2018-07-02 RX ORDER — MORPHINE SULFATE 10 MG/ML
2 INJECTION, SOLUTION INTRAMUSCULAR; INTRAVENOUS
Status: DISCONTINUED | OUTPATIENT
Start: 2018-07-02 | End: 2018-07-02 | Stop reason: HOSPADM

## 2018-07-02 RX ORDER — FENTANYL CITRATE 50 UG/ML
10 INJECTION, SOLUTION INTRAMUSCULAR; INTRAVENOUS
Status: DISCONTINUED | OUTPATIENT
Start: 2018-07-02 | End: 2018-07-03

## 2018-07-02 RX ORDER — NALOXONE HYDROCHLORIDE 0.4 MG/ML
0.4 INJECTION, SOLUTION INTRAMUSCULAR; INTRAVENOUS; SUBCUTANEOUS AS NEEDED
Status: DISCONTINUED | OUTPATIENT
Start: 2018-07-02 | End: 2018-07-04 | Stop reason: HOSPADM

## 2018-07-02 RX ORDER — MIDAZOLAM HYDROCHLORIDE 1 MG/ML
0.5 INJECTION, SOLUTION INTRAMUSCULAR; INTRAVENOUS
Status: DISCONTINUED | OUTPATIENT
Start: 2018-07-02 | End: 2018-07-02 | Stop reason: HOSPADM

## 2018-07-02 RX ORDER — ONDANSETRON 2 MG/ML
4 INJECTION INTRAMUSCULAR; INTRAVENOUS AS NEEDED
Status: DISCONTINUED | OUTPATIENT
Start: 2018-07-02 | End: 2018-07-02 | Stop reason: HOSPADM

## 2018-07-02 RX ORDER — TRAMADOL HYDROCHLORIDE 50 MG/1
50 TABLET ORAL
Status: DISCONTINUED | OUTPATIENT
Start: 2018-07-02 | End: 2018-07-04 | Stop reason: HOSPADM

## 2018-07-02 RX ORDER — AMOXICILLIN 250 MG
1 CAPSULE ORAL 2 TIMES DAILY
Status: DISCONTINUED | OUTPATIENT
Start: 2018-07-02 | End: 2018-07-04 | Stop reason: HOSPADM

## 2018-07-02 RX ORDER — PROPOFOL 10 MG/ML
INJECTION, EMULSION INTRAVENOUS
Status: DISCONTINUED | OUTPATIENT
Start: 2018-07-02 | End: 2018-07-02 | Stop reason: HOSPADM

## 2018-07-02 RX ORDER — WARFARIN SODIUM 5 MG/1
5 TABLET ORAL ONCE
Status: COMPLETED | OUTPATIENT
Start: 2018-07-02 | End: 2018-07-02

## 2018-07-02 RX ORDER — SODIUM CHLORIDE 9 MG/ML
125 INJECTION, SOLUTION INTRAVENOUS CONTINUOUS
Status: DISPENSED | OUTPATIENT
Start: 2018-07-02 | End: 2018-07-03

## 2018-07-02 RX ORDER — OXYCODONE AND ACETAMINOPHEN 5; 325 MG/1; MG/1
1 TABLET ORAL AS NEEDED
Status: DISCONTINUED | OUTPATIENT
Start: 2018-07-02 | End: 2018-07-02 | Stop reason: HOSPADM

## 2018-07-02 RX ORDER — PHENYLEPHRINE HCL IN 0.9% NACL 0.4MG/10ML
SYRINGE (ML) INTRAVENOUS AS NEEDED
Status: DISCONTINUED | OUTPATIENT
Start: 2018-07-02 | End: 2018-07-02 | Stop reason: HOSPADM

## 2018-07-02 RX ORDER — ONDANSETRON 2 MG/ML
4 INJECTION INTRAMUSCULAR; INTRAVENOUS
Status: ACTIVE | OUTPATIENT
Start: 2018-07-02 | End: 2018-07-03

## 2018-07-02 RX ORDER — PROPOFOL 10 MG/ML
INJECTION, EMULSION INTRAVENOUS AS NEEDED
Status: DISCONTINUED | OUTPATIENT
Start: 2018-07-02 | End: 2018-07-02

## 2018-07-02 RX ORDER — SODIUM CHLORIDE, SODIUM LACTATE, POTASSIUM CHLORIDE, CALCIUM CHLORIDE 600; 310; 30; 20 MG/100ML; MG/100ML; MG/100ML; MG/100ML
125 INJECTION, SOLUTION INTRAVENOUS CONTINUOUS
Status: DISCONTINUED | OUTPATIENT
Start: 2018-07-02 | End: 2018-07-02 | Stop reason: HOSPADM

## 2018-07-02 RX ORDER — MORPHINE SULFATE 0.5 MG/ML
INJECTION, SOLUTION EPIDURAL; INTRATHECAL; INTRAVENOUS AS NEEDED
Status: DISCONTINUED | OUTPATIENT
Start: 2018-07-02 | End: 2018-07-02

## 2018-07-02 RX ORDER — FENTANYL CITRATE 50 UG/ML
INJECTION, SOLUTION INTRAMUSCULAR; INTRAVENOUS AS NEEDED
Status: DISCONTINUED | OUTPATIENT
Start: 2018-07-02 | End: 2018-07-02 | Stop reason: HOSPADM

## 2018-07-02 RX ORDER — SODIUM CHLORIDE 0.9 % (FLUSH) 0.9 %
5-10 SYRINGE (ML) INJECTION AS NEEDED
Status: DISCONTINUED | OUTPATIENT
Start: 2018-07-02 | End: 2018-07-04 | Stop reason: HOSPADM

## 2018-07-02 RX ORDER — ATENOLOL 25 MG/1
100 TABLET ORAL DAILY
Status: DISCONTINUED | OUTPATIENT
Start: 2018-07-03 | End: 2018-07-04 | Stop reason: HOSPADM

## 2018-07-02 RX ORDER — CYCLOBENZAPRINE HCL 10 MG
5 TABLET ORAL
Status: DISCONTINUED | OUTPATIENT
Start: 2018-07-02 | End: 2018-07-04 | Stop reason: HOSPADM

## 2018-07-02 RX ORDER — HYDROCHLOROTHIAZIDE 25 MG/1
25 TABLET ORAL DAILY
Status: DISCONTINUED | OUTPATIENT
Start: 2018-07-02 | End: 2018-07-04 | Stop reason: HOSPADM

## 2018-07-02 RX ORDER — LIDOCAINE HYDROCHLORIDE 10 MG/ML
0.1 INJECTION, SOLUTION EPIDURAL; INFILTRATION; INTRACAUDAL; PERINEURAL AS NEEDED
Status: DISCONTINUED | OUTPATIENT
Start: 2018-07-02 | End: 2018-07-02 | Stop reason: HOSPADM

## 2018-07-02 RX ORDER — SODIUM CHLORIDE 9 MG/ML
25 INJECTION, SOLUTION INTRAVENOUS CONTINUOUS
Status: DISCONTINUED | OUTPATIENT
Start: 2018-07-02 | End: 2018-07-02 | Stop reason: HOSPADM

## 2018-07-02 RX ORDER — CEFAZOLIN SODIUM/WATER 2 G/20 ML
2 SYRINGE (ML) INTRAVENOUS EVERY 8 HOURS
Status: COMPLETED | OUTPATIENT
Start: 2018-07-02 | End: 2018-07-02

## 2018-07-02 RX ORDER — ACETAMINOPHEN 10 MG/ML
INJECTION, SOLUTION INTRAVENOUS AS NEEDED
Status: DISCONTINUED | OUTPATIENT
Start: 2018-07-02 | End: 2018-07-02 | Stop reason: HOSPADM

## 2018-07-02 RX ORDER — INSULIN LISPRO 100 [IU]/ML
INJECTION, SOLUTION INTRAVENOUS; SUBCUTANEOUS EVERY 4 HOURS
Status: DISCONTINUED | OUTPATIENT
Start: 2018-07-02 | End: 2018-07-02

## 2018-07-02 RX ORDER — ACETAMINOPHEN 500 MG
1000 TABLET ORAL EVERY 6 HOURS
Status: DISCONTINUED | OUTPATIENT
Start: 2018-07-02 | End: 2018-07-04 | Stop reason: HOSPADM

## 2018-07-02 RX ORDER — HYDROMORPHONE HYDROCHLORIDE 2 MG/ML
INJECTION, SOLUTION INTRAMUSCULAR; INTRAVENOUS; SUBCUTANEOUS AS NEEDED
Status: DISCONTINUED | OUTPATIENT
Start: 2018-07-02 | End: 2018-07-02 | Stop reason: HOSPADM

## 2018-07-02 RX ORDER — OXYCODONE HYDROCHLORIDE 5 MG/1
10 TABLET ORAL
Status: DISCONTINUED | OUTPATIENT
Start: 2018-07-02 | End: 2018-07-03

## 2018-07-02 RX ORDER — DIPHENHYDRAMINE HCL 12.5MG/5ML
12.5 ELIXIR ORAL
Status: ACTIVE | OUTPATIENT
Start: 2018-07-02 | End: 2018-07-03

## 2018-07-02 RX ORDER — BUPIVACAINE HYDROCHLORIDE AND EPINEPHRINE 5; 5 MG/ML; UG/ML
INJECTION, SOLUTION EPIDURAL; INTRACAUDAL; PERINEURAL AS NEEDED
Status: DISCONTINUED | OUTPATIENT
Start: 2018-07-02 | End: 2018-07-02 | Stop reason: HOSPADM

## 2018-07-02 RX ORDER — DEXMEDETOMIDINE HYDROCHLORIDE 4 UG/ML
INJECTION, SOLUTION INTRAVENOUS AS NEEDED
Status: DISCONTINUED | OUTPATIENT
Start: 2018-07-02 | End: 2018-07-02 | Stop reason: HOSPADM

## 2018-07-02 RX ORDER — MIDAZOLAM HYDROCHLORIDE 1 MG/ML
INJECTION, SOLUTION INTRAMUSCULAR; INTRAVENOUS AS NEEDED
Status: DISCONTINUED | OUTPATIENT
Start: 2018-07-02 | End: 2018-07-02 | Stop reason: HOSPADM

## 2018-07-02 RX ORDER — AMLODIPINE BESYLATE 5 MG/1
5 TABLET ORAL DAILY
Status: DISCONTINUED | OUTPATIENT
Start: 2018-07-03 | End: 2018-07-04 | Stop reason: HOSPADM

## 2018-07-02 RX ORDER — DEXTROSE 50 % IN WATER (D50W) INTRAVENOUS SYRINGE
12.5-25 AS NEEDED
Status: DISCONTINUED | OUTPATIENT
Start: 2018-07-02 | End: 2018-07-04 | Stop reason: HOSPADM

## 2018-07-02 RX ORDER — LOSARTAN POTASSIUM 50 MG/1
100 TABLET ORAL DAILY
Status: DISCONTINUED | OUTPATIENT
Start: 2018-07-02 | End: 2018-07-04 | Stop reason: HOSPADM

## 2018-07-02 RX ORDER — FENTANYL CITRATE 50 UG/ML
25 INJECTION, SOLUTION INTRAMUSCULAR; INTRAVENOUS
Status: DISCONTINUED | OUTPATIENT
Start: 2018-07-02 | End: 2018-07-02 | Stop reason: HOSPADM

## 2018-07-02 RX ORDER — BUPIVACAINE HYDROCHLORIDE 5 MG/ML
INJECTION, SOLUTION EPIDURAL; INTRACAUDAL AS NEEDED
Status: DISCONTINUED | OUTPATIENT
Start: 2018-07-02 | End: 2018-07-02 | Stop reason: HOSPADM

## 2018-07-02 RX ORDER — FENTANYL CITRATE 50 UG/ML
50 INJECTION, SOLUTION INTRAMUSCULAR; INTRAVENOUS AS NEEDED
Status: DISCONTINUED | OUTPATIENT
Start: 2018-07-02 | End: 2018-07-02 | Stop reason: HOSPADM

## 2018-07-02 RX ORDER — CELECOXIB 200 MG/1
200 CAPSULE ORAL 2 TIMES DAILY
Status: DISCONTINUED | OUTPATIENT
Start: 2018-07-02 | End: 2018-07-04 | Stop reason: HOSPADM

## 2018-07-02 RX ORDER — MIDAZOLAM HYDROCHLORIDE 1 MG/ML
1 INJECTION, SOLUTION INTRAMUSCULAR; INTRAVENOUS AS NEEDED
Status: DISCONTINUED | OUTPATIENT
Start: 2018-07-02 | End: 2018-07-02 | Stop reason: HOSPADM

## 2018-07-02 RX ORDER — INSULIN LISPRO 100 [IU]/ML
INJECTION, SOLUTION INTRAVENOUS; SUBCUTANEOUS
Status: DISCONTINUED | OUTPATIENT
Start: 2018-07-02 | End: 2018-07-04 | Stop reason: HOSPADM

## 2018-07-02 RX ORDER — POLYETHYLENE GLYCOL 3350 17 G/17G
17 POWDER, FOR SOLUTION ORAL DAILY
Status: DISCONTINUED | OUTPATIENT
Start: 2018-07-03 | End: 2018-07-04 | Stop reason: HOSPADM

## 2018-07-02 RX ORDER — CELECOXIB 200 MG/1
200 CAPSULE ORAL 2 TIMES DAILY
Status: DISCONTINUED | OUTPATIENT
Start: 2018-07-02 | End: 2018-07-02

## 2018-07-02 RX ORDER — CELECOXIB 200 MG/1
200 CAPSULE ORAL ONCE
Status: COMPLETED | OUTPATIENT
Start: 2018-07-02 | End: 2018-07-02

## 2018-07-02 RX ORDER — DIPHENHYDRAMINE HYDROCHLORIDE 50 MG/ML
12.5 INJECTION, SOLUTION INTRAMUSCULAR; INTRAVENOUS AS NEEDED
Status: DISCONTINUED | OUTPATIENT
Start: 2018-07-02 | End: 2018-07-02 | Stop reason: HOSPADM

## 2018-07-02 RX ORDER — ACETAMINOPHEN 500 MG
1000 TABLET ORAL ONCE
Status: COMPLETED | OUTPATIENT
Start: 2018-07-02 | End: 2018-07-02

## 2018-07-02 RX ORDER — SODIUM CHLORIDE 0.9 % (FLUSH) 0.9 %
5-10 SYRINGE (ML) INJECTION EVERY 8 HOURS
Status: DISCONTINUED | OUTPATIENT
Start: 2018-07-03 | End: 2018-07-04 | Stop reason: HOSPADM

## 2018-07-02 RX ORDER — DEXTROSE 50 % IN WATER (D50W) INTRAVENOUS SYRINGE
Status: DISCONTINUED
Start: 2018-07-02 | End: 2018-07-02

## 2018-07-02 RX ORDER — MAGNESIUM SULFATE 100 %
4 CRYSTALS MISCELLANEOUS AS NEEDED
Status: DISCONTINUED | OUTPATIENT
Start: 2018-07-02 | End: 2018-07-04 | Stop reason: HOSPADM

## 2018-07-02 RX ORDER — DEXTROSE 50 % IN WATER (D50W) INTRAVENOUS SYRINGE
12.5 ONCE
Status: COMPLETED | OUTPATIENT
Start: 2018-07-02 | End: 2018-07-02

## 2018-07-02 RX ORDER — SODIUM CHLORIDE 0.9 % (FLUSH) 0.9 %
5-10 SYRINGE (ML) INJECTION AS NEEDED
Status: DISCONTINUED | OUTPATIENT
Start: 2018-07-02 | End: 2018-07-02 | Stop reason: HOSPADM

## 2018-07-02 RX ORDER — ONDANSETRON 2 MG/ML
INJECTION INTRAMUSCULAR; INTRAVENOUS AS NEEDED
Status: DISCONTINUED | OUTPATIENT
Start: 2018-07-02 | End: 2018-07-02 | Stop reason: HOSPADM

## 2018-07-02 RX ORDER — FACIAL-BODY WIPES
10 EACH TOPICAL DAILY PRN
Status: DISCONTINUED | OUTPATIENT
Start: 2018-07-04 | End: 2018-07-04 | Stop reason: HOSPADM

## 2018-07-02 RX ADMIN — Medication 80 MCG: at 08:00

## 2018-07-02 RX ADMIN — FENTANYL CITRATE 25 MCG: 50 INJECTION, SOLUTION INTRAMUSCULAR; INTRAVENOUS at 09:21

## 2018-07-02 RX ADMIN — ACETAMINOPHEN 1000 MG: 10 INJECTION, SOLUTION INTRAVENOUS at 09:32

## 2018-07-02 RX ADMIN — DEXMEDETOMIDINE HYDROCHLORIDE 2 MCG: 4 INJECTION, SOLUTION INTRAVENOUS at 07:55

## 2018-07-02 RX ADMIN — DEXMEDETOMIDINE HYDROCHLORIDE 2 MCG: 4 INJECTION, SOLUTION INTRAVENOUS at 08:06

## 2018-07-02 RX ADMIN — MIDAZOLAM HYDROCHLORIDE 1 MG: 1 INJECTION, SOLUTION INTRAMUSCULAR; INTRAVENOUS at 07:39

## 2018-07-02 RX ADMIN — CEFAZOLIN 3 G: 1 INJECTION, POWDER, FOR SOLUTION INTRAMUSCULAR; INTRAVENOUS; PARENTERAL at 07:42

## 2018-07-02 RX ADMIN — ACETAMINOPHEN 1000 MG: 500 TABLET, FILM COATED ORAL at 07:27

## 2018-07-02 RX ADMIN — OXYCODONE HYDROCHLORIDE 10 MG: 5 TABLET ORAL at 15:02

## 2018-07-02 RX ADMIN — CYCLOBENZAPRINE HYDROCHLORIDE 5 MG: 10 TABLET, FILM COATED ORAL at 23:50

## 2018-07-02 RX ADMIN — Medication 2 G: at 23:21

## 2018-07-02 RX ADMIN — HYDROMORPHONE HYDROCHLORIDE 0.5 MG: 2 INJECTION, SOLUTION INTRAMUSCULAR; INTRAVENOUS; SUBCUTANEOUS at 09:49

## 2018-07-02 RX ADMIN — BUPIVACAINE HYDROCHLORIDE 12.5 MG: 5 INJECTION, SOLUTION EPIDURAL; INTRACAUDAL at 07:46

## 2018-07-02 RX ADMIN — FENTANYL CITRATE 25 MCG: 50 INJECTION, SOLUTION INTRAMUSCULAR; INTRAVENOUS at 08:47

## 2018-07-02 RX ADMIN — CELECOXIB 200 MG: 200 CAPSULE ORAL at 17:24

## 2018-07-02 RX ADMIN — MIDAZOLAM HYDROCHLORIDE 1 MG: 1 INJECTION, SOLUTION INTRAMUSCULAR; INTRAVENOUS at 08:18

## 2018-07-02 RX ADMIN — SENNOSIDES AND DOCUSATE SODIUM 1 TABLET: 8.6; 5 TABLET ORAL at 14:18

## 2018-07-02 RX ADMIN — SENNOSIDES AND DOCUSATE SODIUM 1 TABLET: 8.6; 5 TABLET ORAL at 17:24

## 2018-07-02 RX ADMIN — WARFARIN SODIUM 5 MG: 5 TABLET ORAL at 14:18

## 2018-07-02 RX ADMIN — FENTANYL CITRATE 25 MCG: 50 INJECTION, SOLUTION INTRAMUSCULAR; INTRAVENOUS at 07:39

## 2018-07-02 RX ADMIN — PROPOFOL 25 MCG/KG/MIN: 10 INJECTION, EMULSION INTRAVENOUS at 07:55

## 2018-07-02 RX ADMIN — DEXMEDETOMIDINE HYDROCHLORIDE 4 MCG: 4 INJECTION, SOLUTION INTRAVENOUS at 08:30

## 2018-07-02 RX ADMIN — SODIUM CHLORIDE 125 ML/HR: 900 INJECTION, SOLUTION INTRAVENOUS at 20:14

## 2018-07-02 RX ADMIN — Medication 2 G: at 15:48

## 2018-07-02 RX ADMIN — FENTANYL CITRATE 25 MCG: 50 INJECTION, SOLUTION INTRAMUSCULAR; INTRAVENOUS at 07:42

## 2018-07-02 RX ADMIN — ACETAMINOPHEN 1000 MG: 500 TABLET, FILM COATED ORAL at 14:17

## 2018-07-02 RX ADMIN — CELECOXIB 200 MG: 200 CAPSULE ORAL at 07:27

## 2018-07-02 RX ADMIN — DEXMEDETOMIDINE HYDROCHLORIDE 2 MCG: 4 INJECTION, SOLUTION INTRAVENOUS at 08:01

## 2018-07-02 RX ADMIN — SODIUM CHLORIDE, SODIUM LACTATE, POTASSIUM CHLORIDE, AND CALCIUM CHLORIDE 125 ML/HR: 600; 310; 30; 20 INJECTION, SOLUTION INTRAVENOUS at 07:25

## 2018-07-02 RX ADMIN — ACETAMINOPHEN 1000 MG: 500 TABLET, FILM COATED ORAL at 21:06

## 2018-07-02 RX ADMIN — ONDANSETRON 4 MG: 2 INJECTION INTRAMUSCULAR; INTRAVENOUS at 09:51

## 2018-07-02 RX ADMIN — MIDAZOLAM HYDROCHLORIDE 2 MG: 1 INJECTION, SOLUTION INTRAMUSCULAR; INTRAVENOUS at 07:35

## 2018-07-02 RX ADMIN — Medication 40 MCG: at 08:42

## 2018-07-02 RX ADMIN — DEXTROSE MONOHYDRATE 12.5 G: 25 INJECTION, SOLUTION INTRAVENOUS at 11:04

## 2018-07-02 RX ADMIN — MIDAZOLAM HYDROCHLORIDE 1 MG: 1 INJECTION, SOLUTION INTRAMUSCULAR; INTRAVENOUS at 09:07

## 2018-07-02 RX ADMIN — DEXMEDETOMIDINE HYDROCHLORIDE 2 MCG: 4 INJECTION, SOLUTION INTRAVENOUS at 07:58

## 2018-07-02 RX ADMIN — Medication 80 MCG: at 07:56

## 2018-07-02 NOTE — PROGRESS NOTES
Patient has sliding scale Q 4 hours. Left message for Aurora Valdivia to address this and change to achs. Waiting for callback.

## 2018-07-02 NOTE — PROGRESS NOTES
TRANSFER - IN REPORT:    Verbal report received from Pamela Partida RN(name) on [employer]  being received from [facility]) for routine post - op      Report consisted of patients Situation, Background, Assessment and   Recommendations(SBAR). Information from the following report(s) SBAR, Kardex, OR Summary, Procedure Summary, Intake/Output, MAR and Recent Results was reviewed with the receiving nurse. Opportunity for questions and clarification was provided. Assessment completed upon patients arrival to unit and care assumed.

## 2018-07-02 NOTE — ANESTHESIA PREPROCEDURE EVALUATION
Anesthetic History   No history of anesthetic complications            Review of Systems / Medical History  Patient summary reviewed, nursing notes reviewed and pertinent labs reviewed    Pulmonary  Within defined limits                 Neuro/Psych   Within defined limits           Cardiovascular    Hypertension                   GI/Hepatic/Renal  Within defined limits              Endo/Other    Diabetes    Morbid obesity and arthritis     Other Findings              Physical Exam    Airway  Mallampati: II  TM Distance: > 6 cm  Neck ROM: normal range of motion   Mouth opening: Normal     Cardiovascular  Regular rate and rhythm,  S1 and S2 normal,  no murmur, click, rub, or gallop             Dental  No notable dental hx       Pulmonary  Breath sounds clear to auscultation               Abdominal  GI exam deferred       Other Findings            Anesthetic Plan    ASA: 3  Anesthesia type: general          Induction: Intravenous  Anesthetic plan and risks discussed with: Patient

## 2018-07-02 NOTE — PERIOP NOTES
TRANSFER - OUT REPORT:    Verbal report given to [facility]) on [employer]  being transferred to 553(unit) for routine post - op       Report consisted of patients Situation, Background, Assessment and   Recommendations(SBAR). Time Pre op antibiotic given:0742  Anesthesia Stop time: 8009  Davis Present on Transfer to floor:yes  Order for Davis on Chart:yes  Discharge Prescriptions with Chart:no      Information from the following report(s) SBAR, OR Summary, Intake/Output and Cardiac Rhythm NSR was reviewed with the receiving nurse. Opportunity for questions and clarification was provided. Is the patient on 02? NO           Is the patient on a monitor? NO    Is the nurse transporting with the patient? NO    Surgical Waiting Area notified of patient's transfer from PACU? YES      The following personal items collected during your admission accompanied patient upon transfer:   Dental Appliance: Dental Appliances: None  Vision: Visual Aid: None  Hearing Aid:    Jewelry: Jewelry: None  Clothing: Clothing:  (bag of clothes & cane returned to pt in pacu)  Other Valuables:  Other Valuables: Cane (CANE TO PACU)  Valuables sent to safe:

## 2018-07-02 NOTE — ANESTHESIA POSTPROCEDURE EVALUATION
Post-Anesthesia Evaluation and Assessment    Patient: Stone Conway MRN: [id number]  SSN: [id number]    YOB: 1965  Age: 45 y.o. Sex: female       Cardiovascular Function/Vital Signs  Visit Vitals    /52 (BP 1 Location: Left arm, BP Patient Position: At rest)    Pulse 70    Temp 36.4 °C (97.6 °F)    Resp 16    Ht 5' 9\" (1.753 m)    Wt 63.5 kg (140 lb)    SpO2 100%    BMI 20.67 kg/m2       Patient is status post general anesthesia for Procedure(s):  LEFT TOTAL HIP ARTHROPLASTY/POSTERIOR. Nausea/Vomiting: None    Postoperative hydration reviewed and adequate. Pain:  Pain Scale 1: Numeric (0 - 10) (07/02/18 1015)  Pain Intensity 1: 0 (07/02/18 1015)   Managed    Neurological Status:   Neuro (WDL): Exceptions to WDL (07/02/18 1015)  Neuro  LLE Motor Response: Weak (07/02/18 1015)  RLE Motor Response: Weak (07/02/18 1015)   At baseline    Mental Status and Level of Consciousness: Arousable    Pulmonary Status:   O2 Device: Nasal cannula (07/02/18 1015)   Adequate oxygenation and airway patent    Complications related to anesthesia: None    Post-anesthesia assessment completed.  No concerns    Signed By: Oni Lin DO     July 2, 2018

## 2018-07-02 NOTE — PROGRESS NOTES
Problem: Mobility Impaired (Adult and Pediatric)  Goal: *Acute Goals and Plan of Care (Insert Text)  Physical Therapy Goals  Initiated 7/2/2018    1. Patient will move from supine to sit and sit to supine  in bed with modified independence within 4 days. 2. Patient will perform sit to stand with modified independence within 4 days. 3. Patient will ambulate with modified independence for 200 feet with the least restrictive device within 4 days. 4. Patient will ascend/descend 14 stairs with single handrail(s) with supervision/set-up within 4 days. 5. Patient will perform total hip home exercise program per protocol with independence within 4 days. physical Therapy EVALUATION  Patient: Silvestre Mazariegos (89 y.o. female)  Date: 7/2/2018  Primary Diagnosis: OSTEO ARTHRITIS LEFT HIP-M16.12  Arthritis of left hip  Procedure(s) (LRB):  LEFT TOTAL HIP ARTHROPLASTY/POSTERIOR (Left) Day of Surgery   Precautions:   Fall, WBAT (posterior hip precautions)    ASSESSMENT :  Based on the objective data described below, the patient presents with decreased LLE ROM and strength, decreased functional mobility, impaired balance and gait s/p L BERNIE--posterior approach. Patient received supine in bed and agreeable to therapy. Pt reported prior to admission she was independent and ambulatory with a cane, lives with family in a 2 story apartment. Patient educated on posterior hip precautions. Pt completed supine to sit with min assist to mobilize LLE and verbal cues to maintain hip prec. Patient performed sit<>stand with RW with min assist x 2 and ambulated ~45 feet with RW with min assist. Patient demonstrated step to gait pattern, but progressed to step through. Patient reported absence of \"arthritis pain\" during standing, but does have surgical pain. Patient assisted back to supine position. Patient will continue to benefit from PT to progress mobility and reach highest level of independence.  Patient will benefit from HHPT upon discharge. .    Patient will benefit from skilled intervention to address the above impairments. Patients rehabilitation potential is considered to be Good  Factors which may influence rehabilitation potential include:   [x]         None noted  []         Mental ability/status  []         Medical condition  []         Home/family situation and support systems  []         Safety awareness  []         Pain tolerance/management  []         Other:      PLAN :  Recommendations and Planned Interventions:  [x]           Bed Mobility Training             [x]    Neuromuscular Re-Education  [x]           Transfer Training                   []    Orthotic/Prosthetic Training  [x]           Gait Training                         []    Modalities  [x]           Therapeutic Exercises           []    Edema Management/Control  [x]           Therapeutic Activities            [x]    Patient and Family Training/Education  []           Other (comment):    Frequency/Duration: Patient will be followed by physical therapy  twice daily to address goals. Discharge Recommendations: Home Health  Further Equipment Recommendations for Discharge: none     SUBJECTIVE:   Patient stated It feels better to be up.     OBJECTIVE DATA SUMMARY:   HISTORY:    Past Medical History:   Diagnosis Date    Arthritis     RA    Autoimmune disease ([facility]     RA    Diabetes ([facility]     Hypertension     CONTOLLED BY MEDS    Other ill-defined conditions(799.89)     abscesses under the right arm     Past Surgical History:   Procedure Laterality Date    ABDOMEN SURGERY PROC UNLISTED      Lap band    HX GASTRIC BYPASS      GASTRIC BAND.  HX GYN      BTL     Prior Level of Function/Home Situation: ambulatory with cane, lives with family in a 2 story home.  Employed as a  with [employer]  Personal factors and/or comorbidities impacting plan of care:     [facility] Environment: Apartment  # Steps to Enter: 2  Rails to Enter: No  One/Two Story Residence: Two story  # of Interior Steps: 15  Interior Rails: Right  Living Alone: No  Support Systems: Family member(s), Child(bessie)  Patient Expects to be Discharged to[de-identified] Private residence  Current DME Used/Available at Home: Cane, straight, Walker, rolling, Raised toilet seat  Tub or Shower Type: Tub    EXAMINATION/PRESENTATION/DECISION MAKING:   Critical Behavior:  Neurologic State: Alert  Orientation Level: Oriented X4  Cognition: Appropriate decision making, Appropriate for age attention/concentration, Appropriate safety awareness, Follows commands     Hearing: Auditory  Auditory Impairment: None  Skin:    Edema:   Range Of Motion:  AROM: Within functional limits (except L hip s/p BERNIE)                       Strength:    Strength: Within functional limits (except LLE s/p BERNIE)     Tone & Sensation:       Sensation: Intact  Functional Mobility:  Bed Mobility:     Supine to Sit: Minimum assistance  Sit to Supine: Minimum assistance     Transfers:  Sit to Stand: Minimum assistance;Assist x2  Stand to Sit: Minimum assistance                       Balance:   Sitting: Intact  Standing: Impaired  Standing - Static: Good  Standing - Dynamic : Fair  Ambulation/Gait Training:  Distance (ft): 45 Feet (ft)  Assistive Device: Gait belt;Walker, rolling  Ambulation - Level of Assistance: Minimal assistance        Gait Abnormalities: Decreased step clearance     Left Side Weight Bearing: As tolerated  Base of Support: Widened  Stance: Left decreased  Speed/Serenity: Pace decreased (<100 feet/min)  Step Length: Right shortened;Left shortened                     Stairs: Therapeutic Exercises:    Ankle pumps, quad sets, glute sets    Functional Measure:  Tinetti test:    Sitting Balance: 1  Arises: 1  Attempts to Rise: 2  Immediate Standing Balance: 1  Standing Balance: 1  Nudged: 2  Eyes Closed: 1  Turn 360 Degrees - Continuous/Discontinuous: 1  Turn 360 Degrees - Steady/Unsteady: 1  Sitting Down: 1  Balance Score: 12  Indication of Gait: 0  R Step Length/Height: 1  L Step Length/Height: 1  R Foot Clearance: 1  L Foot Clearance: 1  Step Symmetry: 1  Step Continuity: 1  Path: 1  Trunk: 0  Walking Time: 0  Gait Score: 7  Total Score: 19       Tinetti Test and G-code impairment scale:  Percentage of Impairment CH    0%   CI    1-19% CJ    20-39% CK    40-59% CL    60-79% CM    80-99% CN     100%   Tinetti  Score 0-28 28 23-27 17-22 12-16 6-11 1-5 0       Tinetti Tool Score Risk of Falls  <19 = High Fall Risk  19-24 = Moderate Fall Risk  25-28 = Low Fall Risk  Tinetti ME. Performance-Oriented Assessment of Mobility Problems in Elderly Patients. [facility] 66; [id number]. (Scoring Description: PT Bulletin Feb. 10, 1993)    Older adults: Tequila Keyes et al, 2009; n = 1000 [facility] elderly evaluated with ABC, LILI, ADL, and IADL)  · Mean LILI score for males aged 69-68 years = 26.21(3.40)  · Mean LILI score for females age 69-68 years = 25.16(4.30)  · Mean LILI score for males over 80 years = 23.29(6.02)  · Mean LILI score for females over 80 years = 17.20(8.32)         G codes: In compliance with CMSs Claims Based Outcome Reporting, the following G-code set was chosen for this patient based on their primary functional limitation being treated: The outcome measure chosen to determine the severity of the functional limitation was the Tinetti with a score of 19/28 which was correlated with the impairment scale.     ? Mobility - Walking and Moving Around:     - CURRENT STATUS: CJ - 20%-39% impaired, limited or restricted    - GOAL STATUS: CI - 1%-19% impaired, limited or restricted    - D/C STATUS:  ---------------To be determined---------------       Based on the above components, the patient evaluation is determined to be of the following complexity level: LOW     Pain:  Pain Scale 1: Numeric (0 - 10)  Pain Intensity 1: 4  Pain Location 1: Hip  Pain Orientation 1: Left  Pain Description 1: Aching  Pain Intervention(s) 1: Ice  Activity Tolerance:   Good. VSS  Please refer to the flowsheet for vital signs taken during this treatment. After treatment:   []         Patient left in no apparent distress sitting up in chair  [x]         Patient left in no apparent distress in bed  [x]         Call bell left within reach  [x]         Nursing notified  [x]         Caregiver present  []         Bed alarm activated    COMMUNICATION/EDUCATION:   The patients plan of care was discussed with: Registered Nurse. [x]         Fall prevention education was provided and the patient/caregiver indicated understanding. [x]         Patient/family have participated as able in goal setting and plan of care. [x]         Patient/family agree to work toward stated goals and plan of care. []         Patient understands intent and goals of therapy, but is neutral about his/her participation. []         Patient is unable to participate in goal setting and plan of care.     Thank you for this referral.  Marino Lucas, PT, DPT   Time Calculation: 19 mins

## 2018-07-02 NOTE — PROGRESS NOTES
Pharmacist Note - Warfarin Dosing  Consult provided for this 45 y.o. female to manage warfarin for DVT PPX s/p LTH    INR Goal: 1.7- 2.2    Preop Dose: [place]- none    Drugs that may increase INR: None  Drugs that may decrease INR: None  Other current anticoagulants/ drugs that may increase bleeding risk: NSAID  Risk factors: None  Daily INR ordered: YES    No results for input(s): HGB, INR, HGBEXT in the last 72 hours. No lab exists for component: INREXT    Date               INR                 Dose  6/21  1  --  7/2  --  5 mg    Assessment/ Plan: Will order warfarin 5 mg PO x 1 dose. Pharmacy will continue to monitor daily and adjust therapy as indicated.

## 2018-07-02 NOTE — IP AVS SNAPSHOT
[address] 
[phone] Patient: Nalini Juarez MRN: [id number] :1965 A check dina indicates which time of day the medication should be taken. My Medications START taking these medications Instructions Each Dose to Equal  
 Morning Noon Evening Bedtime  
 oxyCODONE IR 5 mg immediate release tablet Commonly known as:  Mason Peralta Your last dose was: Your next dose is: Take 1 Tab by mouth every four (4) hours as needed. Max Daily Amount: 30 mg.  
 5 mg  
    
   
   
   
  
 warfarin 2 mg tablet Commonly known as:  COUMADIN Your last dose was: Your next dose is: Take 1 Tab by mouth daily. Indications: DEEP VEIN THROMBOSIS PREVENTION  
 2 mg CHANGE how you take these medications Instructions Each Dose to Equal  
 Morning Noon Evening Bedtime  
 amLODIPine 5 mg tablet Commonly known as:  Godwin Allen What changed:  See the new instructions. Your last dose was: Your next dose is: TAKE ONE TABLET BY MOUTH ONCE DAILY CONTINUE taking these medications Instructions Each Dose to Equal  
 Morning Noon Evening Bedtime  
 atenolol 100 mg tablet Commonly known as:  TENORMIN Your last dose was: Your next dose is: TAKE ONE TABLET BY MOUTH ONCE DAILY ** NEED TO MAKE APPT**  
     
   
   
   
  
 cyclobenzaprine 5 mg tablet Commonly known as:  FLEXERIL Your last dose was: Your next dose is: Take 1 Tab by mouth three (3) times daily as needed for Muscle Spasm(s). 5 mg  
    
   
   
   
  
 glipiZIDE 10 mg tablet Commonly known as:  Helga Leee Your last dose was: Your next dose is: Take 5 mg by mouth two (2) times a day. TAKES IN AM, & AT LUNCH TIME-1-2 PM  
 5 mg LIPITOR 40 mg tablet Generic drug:  atorvastatin Your last dose was: Your next dose is: Take 40 mg by mouth daily (with breakfast). 40 mg  
    
   
   
   
  
 losartan-hydroCHLOROthiazide 100-25 mg per tablet Commonly known as:  HYZAAR Your last dose was: Your next dose is: Take 1 Tab by mouth daily. 1 Tab SITagliptin-metFORMIN  mg per tablet Commonly known as:  Amedeo Judd Your last dose was: Your next dose is: Take 1 Tab by mouth two (2) times daily (with meals). BREAKFAST & LUNCH  
 1 Tab TYLENOL EXTRA STRENGTH 500 mg tablet Generic drug:  acetaminophen Your last dose was: Your next dose is: Take 1,000 mg by mouth daily. 1000 mg  
    
   
   
   
  
  
STOP taking these medications   
 methotrexate 2.5 mg tablet Commonly known as:  RHEUMATREX  
   
  
 mupirocin 2 % ointment Commonly known as:  BACTROBAN  
   
  
 predniSONE 10 mg tablet Commonly known as:  DELTASONE  
   
  
 REMICADE IV  
   
  
 traMADol 50 mg tablet Commonly known as:  ULTRAM  
   
  
  
  
Where to Get Your Medications Information on where to get these meds will be given to you by the nurse or doctor. ! Ask your nurse or doctor about these medications  
  oxyCODONE IR 5 mg immediate release tablet  
 warfarin 2 mg tablet

## 2018-07-02 NOTE — ANESTHESIA PROCEDURE NOTES
Spinal Block    Start time: 7/2/2018 7:40 AM  End time: 7/2/2018 7:46 AM  Performed by: Geneva Prabhakar  Authorized by: Yvonne Castillo     Pre-procedure:   Indications: at surgeon's request and primary anesthetic  Preanesthetic Checklist: patient identified, risks and benefits discussed, anesthesia consent, site marked, patient being monitored and timeout performed      Spinal Block:   Patient Position:  Seated  Prep Region:  Lumbar  Prep: Betadine      Location:  L3-4  Technique:  Single shot  Local:  Lidocaine 2%  Local Dose (mL):  2    Needle:   Needle Type:  Pencil-tip  Needle Gauge:  24 G  Attempts:  1      Events: CSF confirmed, no blood with aspiration and no paresthesia        Assessment:  Insertion:  Uncomplicated  Patient tolerance:  Patient tolerated the procedure well with no immediate complications

## 2018-07-02 NOTE — PERIOP NOTES
Patient: Sonja Mcelroy MRN: [id number]  SSN: [id number]   YOB: 1965  Age: 45 y.o. Sex: female     Patient is status post Procedure(s):  LEFT TOTAL HIP ARTHROPLASTY/POSTERIOR. Surgeon(s) and Role:     * Jerrell Hartman MD - Primary                      Peripheral IV 07/02/18 Left Antecubital (Active)   Site Assessment Clean, dry, & intact 7/2/2018  7:23 AM   Phlebitis Assessment 0 7/2/2018  7:23 AM   Infiltration Assessment 0 7/2/2018  7:23 AM   Dressing Status Clean, dry, & intact; New 7/2/2018  7:23 AM   Dressing Type Tape;Transparent 7/2/2018  7:23 AM   Hub Color/Line Status Pink 7/2/2018  7:23 AM                           Dressing/Packing:  Wound Hip Left-DRESSING TYPE: Other (Comment) (STAPLES AND AQUCEL ) (07/02/18 0930)  Splint/Cast: Wound Hip Left-SPLINT TYPE/MATERIAL: Other(Comment) (ABDUCTOR PILLOW)]

## 2018-07-02 NOTE — PERIOP NOTES
1040 Pt blood glucose 64. Pt asystematic stated her blood glucose normally 65-70. administered  po fluids. Continue to monitor. 1058 Blood glucose 63. Pt asystematic. Administered dextrose 50% 12.5G  Ivp. Continue to monitor. 1114 Blood glucose 96. Pt now resting in bed no s/s of distress continue to monitor.

## 2018-07-02 NOTE — IP AVS SNAPSHOT
[address] 
[phone] Patient: Nadiya Jaramillo MRN: [id number] :[id number] About your hospitalization You were admitted on:  2018 You last received care in the:  [address] [facility] You were discharged on:  2018 Why you were hospitalized Your primary diagnosis was:  Osteoarthritis Of Left Hip Follow-up Information Follow up With Details Comments Contact Info MD Mk [phone] 
[phone] AT [facility] skilled nursing and physical therapy. [address] [place] [address] 
[phone] Discharge Orders None A check dina indicates which time of day the medication should be taken. My Medications START taking these medications Instructions Each Dose to Equal  
 Morning Noon Evening Bedtime  
 oxyCODONE IR 5 mg immediate release tablet Commonly known as:  Vernalis Reagin Your last dose was: Your next dose is: Take 1 Tab by mouth every four (4) hours as needed. Max Daily Amount: 30 mg.  
 5 mg  
    
   
   
   
  
 warfarin 2 mg tablet Commonly known as:  COUMADIN Your last dose was: Your next dose is: Take 1 Tab by mouth daily. Indications: DEEP VEIN THROMBOSIS PREVENTION  
 2 mg CHANGE how you take these medications Instructions Each Dose to Equal  
 Morning Noon Evening Bedtime  
 amLODIPine 5 mg tablet Commonly known as:  Mikayla Mullins What changed:  See the new instructions. Your last dose was: Your next dose is: TAKE ONE TABLET BY MOUTH ONCE DAILY CONTINUE taking these medications Instructions Each Dose to Equal  
 Morning Noon Evening Bedtime  
 atenolol 100 mg tablet Commonly known as:  TENORMIN Your last dose was: Your next dose is: TAKE ONE TABLET BY MOUTH ONCE DAILY ** NEED TO MAKE APPT**  
     
   
   
   
  
 cyclobenzaprine 5 mg tablet Commonly known as:  FLEXERIL Your last dose was: Your next dose is: Take 1 Tab by mouth three (3) times daily as needed for Muscle Spasm(s). 5 mg  
    
   
   
   
  
 glipiZIDE 10 mg tablet Commonly known as:  Tomas Quinonez Your last dose was: Your next dose is: Take 5 mg by mouth two (2) times a day. TAKES IN AM, & AT LUNCH TIME-1-2 PM  
 5 mg  
    
   
   
   
  
 LIPITOR 40 mg tablet Generic drug:  atorvastatin Your last dose was: Your next dose is: Take 40 mg by mouth daily (with breakfast). 40 mg  
    
   
   
   
  
 losartan-hydroCHLOROthiazide 100-25 mg per tablet Commonly known as:  HYZAAR Your last dose was: Your next dose is: Take 1 Tab by mouth daily. 1 Tab SITagliptin-metFORMIN  mg per tablet Commonly known as:  Tesha Sharp Your last dose was: Your next dose is: Take 1 Tab by mouth two (2) times daily (with meals). BREAKFAST & LUNCH  
 1 Tab TYLENOL EXTRA STRENGTH 500 mg tablet Generic drug:  acetaminophen Your last dose was: Your next dose is: Take 1,000 mg by mouth daily. 1000 mg  
    
   
   
   
  
  
STOP taking these medications   
 methotrexate 2.5 mg tablet Commonly known as:  RHEUMATREX  
   
  
 mupirocin 2 % ointment Commonly known as:  BACTROBAN  
   
  
 predniSONE 10 mg tablet Commonly known as:  DELTASONE  
   
  
 REMICADE IV  
   
  
 traMADol 50 mg tablet Commonly known as:  ULTRAM  
   
  
  
  
Where to Get Your Medications Information on where to get these meds will be given to you by the nurse or doctor. ! Ask your nurse or doctor about these medications oxyCODONE IR 5 mg immediate release tablet  
 warfarin 2 mg tablet Opioid Education Prescription Opioids: What You Need to Know: 
 
Prescription opioids can be used to help relieve moderate-to-severe pain and are often prescribed following a surgery or injury, or for certain health conditions. These medications can be an important part of treatment but also come with serious risks. Opioids are strong pain medicines. Examples include hydrocodone, oxycodone, fentanyl, and morphine. Heroin is an example of an illegal opioid. It is important to work with your health care provider to make sure you are getting the safest, most effective care. WHAT ARE THE RISKS AND SIDE EFFECTS OF OPIOID USE? Prescription opioids carry serious risks of addiction and overdose, especially with prolonged use. An opioid overdose, often marked by slow breathing, can cause sudden death. The use of prescription opioids can have a number of side effects as well, even when taken as directed. · Tolerance-meaning you might need to take more of a medication for the same pain relief · Physical dependence-meaning you have symptoms of withdrawal when the medication is stopped. Withdrawal symptoms can include nausea, sweating, chills, diarrhea, stomach cramps, and muscle aches. Withdrawal can last up to several weeks, depending on which drug you took and how long you took it. · Increased sensitivity to pain · Constipation · Nausea, vomiting, and dry mouth · Sleepiness and dizziness · Confusion · Depression · Low levels of testosterone that can result in lower sex drive, energy, and strength · Itching and sweating RISKS ARE GREATER WITH:      
· History of drug misuse, substance use disorder, or overdose · Mental health conditions (such as depression or anxiety) · Sleep apnea · Older age (72 years or older) · Pregnancy Avoid alcohol while taking prescription opioids.   Also, unless specifically advised by your health care provider, medications to avoid include: · Benzodiazepines (such as Xanax or Valium) · Muscle relaxants (such as Soma or Flexeril) · Hypnotics (such as Ambien or Lunesta) · Other prescription opioids KNOW YOUR OPTIONS Talk to your health care provider about ways to manage your pain that don't involve prescription opioids. Some of these options may actually work better and have fewer risks and side effects. Options may include: 
· Pain relievers such as acetaminophen, ibuprofen, and naproxen · Some medications that are also used for depression or seizures · Physical therapy and exercise · Counseling to help patients learn how to cope better with triggers of pain and stress. · Application of heat or cold compress · Massage therapy · Relaxation techniques Be Informed Make sure you know the name of your medication, how much and how often to take it, and its potential risks & side effects. IF YOU ARE PRESCRIBED OPIOIDS FOR PAIN: 
· Never take opioids in greater amounts or more often than prescribed. Remember the goal is not to be pain-free but to manage your pain at a tolerable level. · Follow up with your primary care provider to: · Work together to create a plan on how to manage your pain. · Talk about ways to help manage your pain that don't involve prescription opioids. · Talk about any and all concerns and side effects. · Help prevent misuse and abuse. · Never sell or share prescription opioids · Help prevent misuse and abuse. · Store prescription opioids in a secure place and out of reach of others (this may include visitors, children, friends, and family). · Safely dispose of unused/unwanted prescription opioids: Find your community drug take-back program or your pharmacy mail-back program, or flush them down the toilet, following guidance from the Food and Drug Administration (www.fda.gov/Drugs/ResourcesForYou). · Visit www.cdc.gov/drugoverdose to learn about the risks of opioid abuse and overdose. · If you believe you may be struggling with addiction, tell your health care provider and ask for guidance or call [address] at 3-808-693-KJZP. Discharge Instructions Discharge Instructions Posterior Approach Hip Replacement-Dr. Samira Mallory Patient Name: Haley Berry Date of procedure:7/2/2018 Procedure:Procedure(s): LEFT TOTAL HIP ARTHROPLASTY/POSTERIOR Surgeon: Surgeon(s) and Role: Aguilar Jordan MD - Primary PCP: Tr Alcantar MD 
Date of discharge: No discharge date for patient encounter. Follow up appointments ? Follow up with Dr. Samira Mallory in 3 weeks. Call [phone] to make an appointment. ? If home health has been arranged for you the agency will contact you to arrange dates/times for visits. Please call them if you do not hear from them within 24 hours after you are discharged When to call your Orthopaedic Surgeon: Call [phone]. If you need to reach us after 5pm or on a weekend; call [phone] and the on call physician will be contacted ? Increased hip pain, unrelieved pain or if you have difficulty or are unable to walk ? Signs of infection-if your incision is red; continues to have drainage; drainage has a foul odor or if you have a persistent fever over 101 degrees ? Signs of a blood clot in your leg-calf pain, tenderness, redness, swelling of lower leg When to call your Primary Care Physician: 
? Concerns about medical conditions such as diabetes, high blood pressure, asthma, congestive heart failure ? Call if blood sugars are elevated, persistent headache or dizziness, coughing or congestion, constipation or diarrhea, burning with urination, abnormal heart rate When to call [id number] go to the nearest emergency room ? Acute onset of chest pain, shortness of breath, difficulty breathing Activity ? Weight bearing as tolerated with walker or crutches. Refer to pages 23-33 of your handbook for instructions and pictures ? Complete your Home Exercise Program daily as instructed by your therapist.  Refer to pages 36-42 of your handbook for instructions and pictures ? Get up every one hour and walk (except at night when sleeping) ? Maintain hip dislocation precautions for 6 weeks.  
 -do not cross your legs 
 -do not roll your surgery leg inward 
 -do not bend more than 90 degrees ? Do not drive or operate heavy machinery Incision Care ? The Aquacel (brown, waterproof) surgical dressing is to remain on your knee for 7 days. On the 7th day have someone gently peel the dressing off by carefully lifting the edge and stretching it slightly to break the adhesive seal and leave incision open to air. You may take a shower with the Aquacel dressing in place. ? You do have staples in your knee incision; if present they will be removed by the [address] [place] [address] Raymond Hoover staff in 10-14 days and steri-strips will be applied. Leave the steri-strips on until they fall off Preventing blood clots Take Coumadin as prescribed. Pain management ? Take pain medication as prescribed; decrease the amount you use as your pain lessens ? Avoid alcoholic beverages while taking pain medication ? Do not take any over-the-counter medication except for Tylenol (acetaminophen) ? Please be aware that many medications contain Tylenol. We do not want you to over medicate so please read the information below as a guide. Do not take more than 3 Grams of Tylenol in a 24 hour period. (There are 1000 milligrams in one Gram) 
o 325 mg of Tylenol per tablet (do not take more than 9 tablets in 24 hours) 
o 500 mg of Tylenol per tablet (do not take more than 6 tablets in 24 hours) 
o  
?  You may place an ice bag on your hip for 15-20 minutes after exercising and as needed throughout the day and night Diet ? Resume usual diet; drink plenty of fluids; eat foods high in fiber ? You may want to take a stool softener (such as Senokot-S or Colace) to prevent constipation while you are taking pain medication. If constipation occurs, take a laxative (such as Dulcolax tablets, Milk of Magnesia, or a suppository) Introducing [facility]! Magdaleno Flores introduces Partly patient portal. Now you can access parts of your medical record, email your doctor's office, and request medication refills online. 1. In your internet browser, go to [email or web address] 2. Click on the First Time User? Click Here link in the Sign In box. You will see the New Member Sign Up page. 3. Enter your Partly Access Code exactly as it appears below. You will not need to use this code after youve completed the sign-up process. If you do not sign up before the expiration date, you must request a new code. · Partly Access Code: [email or web address] Expires: 9/4/2018  4:39 AM 
 
4. Enter the last four digits of your Social Security Number (xxxx) and Date of Birth (mm/dd/yyyy) as indicated and click Submit. You will be taken to the next sign-up page. 5. Create a Partly ID. This will be your Partly login ID and cannot be changed, so think of one that is secure and easy to remember. 6. Create a Partly password. You can change your password at any time. 7. Enter your Password Reset Question and Answer. This can be used at a later time if you forget your password. 8. Enter your e-mail address. You will receive e-mail notification when new information is available in [address] 9. Click Sign Up. You can now view and download portions of your medical record. 10. Click the Download Summary menu link to download a portable copy of your medical information.  
 
If you have questions, please visit the Frequently Asked Questions section of the [employer]. Remember, MyChart is NOT to be used for urgent needs. For medical emergencies, dial 911. Now available from your iPhone and Android! Introducing Jeramie Mary As a [facility] patient, I wanted to make you aware of our electronic visit tool called Jeramie Mary. [employer] 24/7 allows you to connect within minutes with a medical provider 24 hours a day, seven days a week via a mobile device or tablet or logging into a secure website from your computer. You can access Jeramie Mary from anywhere in the [place]. A virtual visit might be right for you when you have a simple condition and feel like you just dont want to get out of bed, or cant get away from work for an appointment, when your regular [facility] provider is not available (evenings, weekends or holidays), or when youre out of town and need minor care. Electronic visits cost only $49 and if the [employer] 24/7 provider determines a prescription is needed to treat your condition, one can be electronically transmitted to a nearby pharmacy*. Please take a moment to enroll today if you have not already done so. The enrollment process is free and takes just a few minutes. To enroll, please download the [employer] 24/7 vilma to your tablet or phone, or visit [email or web address] to enroll on your computer. And, as an [address] [place] [address] patient with a [employer] account, the results of your visits will be scanned into your electronic medical record and your primary care provider will be able to view the scanned results. We urge you to continue to see your regular [facility] provider for your ongoing medical care. And while your primary care provider may not be the one available when you seek a Jeramie Mary virtual visit, the peace of mind you get from getting a real diagnosis real time can be priceless. For more information on Jeramie Mary, view our Frequently Asked Questions (FAQs) at [email or web address]. Sincerely, 
 
Clarice Martinez MD 
Chief Medical Officer [employer] *:  certain medications cannot be prescribed via Jeramie Mary Providers Seen During Your Hospitalization Provider Specialty Primary office phone Graciela Alejandra MD Orthopedic Surgery [phone] Your Primary Care Physician (PCP) Primary Care Physician Office Phone Office Fax [address] [phone] You are allergic to the following No active allergies Recent Documentation Height Weight BMI OB Status Smoking Status 1.753 m 63.5 kg 20.67 kg/m2 Hysterectomy Never Smoker Emergency Contacts Name Discharge Info Relation Home Work Mobile [address] CAREGIVER [3] Child [2] [phone] Patient Belongings The following personal items are in your possession at time of discharge: 
  Dental Appliances: None  Visual Aid: None      Home Medications: None   Jewelry: None  Clothing:  (bag of clothes & cane returned to pt in pacu)    Other Valuables: Cane (CANE TO PACU) Please provide this summary of care documentation to your next provider. Signatures-by signing, you are acknowledging that this After Visit Summary has been reviewed with you and you have received a copy. Patient Signature:  ____________________________________________________________ Date:  ____________________________________________________________  
  
Xiomara Armstrong Provider Signature:  ____________________________________________________________ Date:  ____________________________________________________________

## 2018-07-03 PROBLEM — M16.12 OSTEOARTHRITIS OF LEFT HIP: Chronic | Status: ACTIVE | Noted: 2018-07-02

## 2018-07-03 LAB
ANION GAP SERPL CALC-SCNC: 7 MMOL/L (ref 5–15)
BUN SERPL-MCNC: 12 MG/DL (ref 6–20)
BUN/CREAT SERPL: 11 (ref 12–20)
CALCIUM SERPL-MCNC: 8.2 MG/DL (ref 8.5–10.1)
CHLORIDE SERPL-SCNC: 107 MMOL/L (ref 97–108)
CO2 SERPL-SCNC: 26 MMOL/L (ref 21–32)
CREAT SERPL-MCNC: 1.11 MG/DL (ref 0.55–1.02)
GLUCOSE BLD STRIP.AUTO-MCNC: 155 MG/DL (ref 65–100)
GLUCOSE BLD STRIP.AUTO-MCNC: 160 MG/DL (ref 65–100)
GLUCOSE BLD STRIP.AUTO-MCNC: 210 MG/DL (ref 65–100)
GLUCOSE BLD STRIP.AUTO-MCNC: 216 MG/DL (ref 65–100)
GLUCOSE SERPL-MCNC: 137 MG/DL (ref 65–100)
HCT VFR BLD AUTO: 27.9 % (ref 35–47)
HGB BLD-MCNC: 9.1 G/DL (ref 11.5–16)
INR PPP: 1.1 (ref 0.9–1.1)
POTASSIUM SERPL-SCNC: 3.4 MMOL/L (ref 3.5–5.1)
PROTHROMBIN TIME: 10.8 SEC (ref 9–11.1)
SERVICE CMNT-IMP: ABNORMAL
SODIUM SERPL-SCNC: 140 MMOL/L (ref 136–145)

## 2018-07-03 PROCEDURE — 80048 BASIC METABOLIC PNL TOTAL CA: CPT | Performed by: ORTHOPAEDIC SURGERY

## 2018-07-03 PROCEDURE — G8987 SELF CARE CURRENT STATUS: HCPCS

## 2018-07-03 PROCEDURE — 97110 THERAPEUTIC EXERCISES: CPT

## 2018-07-03 PROCEDURE — 74011250637 HC RX REV CODE- 250/637: Performed by: ORTHOPAEDIC SURGERY

## 2018-07-03 PROCEDURE — 85610 PROTHROMBIN TIME: CPT | Performed by: ORTHOPAEDIC SURGERY

## 2018-07-03 PROCEDURE — 97116 GAIT TRAINING THERAPY: CPT

## 2018-07-03 PROCEDURE — 65270000029 HC RM PRIVATE

## 2018-07-03 PROCEDURE — 74011636637 HC RX REV CODE- 636/637: Performed by: PHYSICIAN ASSISTANT

## 2018-07-03 PROCEDURE — 97165 OT EVAL LOW COMPLEX 30 MIN: CPT

## 2018-07-03 PROCEDURE — 74011250637 HC RX REV CODE- 250/637: Performed by: PHYSICIAN ASSISTANT

## 2018-07-03 PROCEDURE — G8988 SELF CARE GOAL STATUS: HCPCS

## 2018-07-03 PROCEDURE — 36415 COLL VENOUS BLD VENIPUNCTURE: CPT | Performed by: ORTHOPAEDIC SURGERY

## 2018-07-03 PROCEDURE — 85018 HEMOGLOBIN: CPT | Performed by: ORTHOPAEDIC SURGERY

## 2018-07-03 PROCEDURE — 97535 SELF CARE MNGMENT TRAINING: CPT

## 2018-07-03 PROCEDURE — 74011250637 HC RX REV CODE- 250/637: Performed by: NURSE PRACTITIONER

## 2018-07-03 PROCEDURE — 82962 GLUCOSE BLOOD TEST: CPT

## 2018-07-03 RX ORDER — OXYCODONE HYDROCHLORIDE 5 MG/1
5-10 TABLET ORAL
Status: DISCONTINUED | OUTPATIENT
Start: 2018-07-03 | End: 2018-07-04 | Stop reason: HOSPADM

## 2018-07-03 RX ORDER — WARFARIN 2 MG/1
2 TABLET ORAL DAILY
Qty: 30 TAB | Refills: 1 | Status: SHIPPED | OUTPATIENT
Start: 2018-07-03 | End: 2019-04-10

## 2018-07-03 RX ORDER — GLIPIZIDE 5 MG/1
5 TABLET, FILM COATED, EXTENDED RELEASE ORAL
Status: DISCONTINUED | OUTPATIENT
Start: 2018-07-04 | End: 2018-07-04 | Stop reason: HOSPADM

## 2018-07-03 RX ORDER — OXYCODONE HYDROCHLORIDE 5 MG/1
5 TABLET ORAL
Qty: 80 TAB | Refills: 0 | Status: SHIPPED | OUTPATIENT
Start: 2018-07-03 | End: 2019-04-10

## 2018-07-03 RX ORDER — WARFARIN SODIUM 5 MG/1
5 TABLET ORAL ONCE
Status: COMPLETED | OUTPATIENT
Start: 2018-07-03 | End: 2018-07-03

## 2018-07-03 RX ORDER — POTASSIUM CHLORIDE 750 MG/1
10 TABLET, FILM COATED, EXTENDED RELEASE ORAL DAILY
Status: DISCONTINUED | OUTPATIENT
Start: 2018-07-03 | End: 2018-07-04 | Stop reason: HOSPADM

## 2018-07-03 RX ADMIN — Medication 10 ML: at 21:42

## 2018-07-03 RX ADMIN — OXYCODONE HYDROCHLORIDE 10 MG: 5 TABLET ORAL at 06:08

## 2018-07-03 RX ADMIN — POTASSIUM CHLORIDE 10 MEQ: 750 TABLET, EXTENDED RELEASE ORAL at 12:15

## 2018-07-03 RX ADMIN — SENNOSIDES AND DOCUSATE SODIUM 1 TABLET: 8.6; 5 TABLET ORAL at 17:57

## 2018-07-03 RX ADMIN — ACETAMINOPHEN 1000 MG: 500 TABLET, FILM COATED ORAL at 15:43

## 2018-07-03 RX ADMIN — Medication 10 ML: at 14:00

## 2018-07-03 RX ADMIN — ACETAMINOPHEN 1000 MG: 500 TABLET, FILM COATED ORAL at 07:47

## 2018-07-03 RX ADMIN — SENNOSIDES AND DOCUSATE SODIUM 1 TABLET: 8.6; 5 TABLET ORAL at 09:34

## 2018-07-03 RX ADMIN — HYDROCHLOROTHIAZIDE 25 MG: 25 TABLET ORAL at 09:35

## 2018-07-03 RX ADMIN — LOSARTAN POTASSIUM 100 MG: 50 TABLET ORAL at 09:35

## 2018-07-03 RX ADMIN — POLYETHYLENE GLYCOL 3350 17 G: 17 POWDER, FOR SOLUTION ORAL at 09:35

## 2018-07-03 RX ADMIN — AMLODIPINE BESYLATE 5 MG: 5 TABLET ORAL at 09:34

## 2018-07-03 RX ADMIN — OXYCODONE HYDROCHLORIDE 10 MG: 5 TABLET ORAL at 03:06

## 2018-07-03 RX ADMIN — Medication 5 ML: at 06:00

## 2018-07-03 RX ADMIN — INSULIN LISPRO 2 UNITS: 100 INJECTION, SOLUTION INTRAVENOUS; SUBCUTANEOUS at 07:30

## 2018-07-03 RX ADMIN — ACETAMINOPHEN 1000 MG: 500 TABLET, FILM COATED ORAL at 21:42

## 2018-07-03 RX ADMIN — CELECOXIB 200 MG: 200 CAPSULE ORAL at 09:34

## 2018-07-03 RX ADMIN — CELECOXIB 200 MG: 200 CAPSULE ORAL at 17:57

## 2018-07-03 RX ADMIN — WARFARIN SODIUM 5 MG: 5 TABLET ORAL at 12:15

## 2018-07-03 RX ADMIN — INSULIN LISPRO 2 UNITS: 100 INJECTION, SOLUTION INTRAVENOUS; SUBCUTANEOUS at 17:56

## 2018-07-03 RX ADMIN — OXYCODONE HYDROCHLORIDE 10 MG: 5 TABLET ORAL at 12:15

## 2018-07-03 RX ADMIN — OXYCODONE HYDROCHLORIDE 5 MG: 5 TABLET ORAL at 23:42

## 2018-07-03 RX ADMIN — OXYCODONE HYDROCHLORIDE 5 MG: 5 TABLET ORAL at 19:10

## 2018-07-03 RX ADMIN — INSULIN LISPRO 3 UNITS: 100 INJECTION, SOLUTION INTRAVENOUS; SUBCUTANEOUS at 11:30

## 2018-07-03 RX ADMIN — ATENOLOL 100 MG: 25 TABLET ORAL at 09:34

## 2018-07-03 NOTE — PROGRESS NOTES
Bedside and Verbal shift change report given to Fco Pompa (oncoming nurse) by Karly Hills RN (offgoing nurse). Report given with Gregory SIDDIQUI and MAR.

## 2018-07-03 NOTE — PROGRESS NOTES
Problem: Self Care Deficits Care Plan (Adult)  Goal: *Acute Goals and Plan of Care (Insert Text)  Occupational Therapy Goals  Initiated: 7/3/2018     1. Patient will perform grooming with supervision/set-up standing at sink within 3 day(s). 2.  Patient will perform bathing with supervision/set-up from chair within 3 day(s). 3.  Patient will perform upper body dressing and lower body dressing with supervision/set-up within 3 day(s). 4.  Patient will perform toilet transfers with supervision/set-up within 3 day(s). 5.  Patient will perform all aspects of toileting with supervision/set-up within 3 day(s). 6.  Patient will be independent with hip precautions within 3 days. Occupational Therapy EVALUATION  Patient: Soy Lowe (45 y.o. female)  Date: 7/3/2018  Primary Diagnosis: OSTEO ARTHRITIS LEFT HIP-M16.12  Arthritis of left hip  Procedure(s) (LRB):  LEFT TOTAL HIP ARTHROPLASTY/POSTERIOR (Left) 1 Day Post-Op   Precautions:   Fall, WBAT    ASSESSMENT :  Based on the objective data described below, the patient presents with decreased independence with self care and functional mobility following admission for posterior THR. Pt with posterior precautions and able to recall 2/3 precautions with independence. Pt reports understanding of training provided and worked on lower body dressing, bed mobility, and toileting. Pt anxious but with practice noticed patients anxiety improving. Pt will benefit from home health OT at discharge due to posterior hip replacement with posterior precautions. Pt will need further IADl training and education, safety, functional mobility, and body mechanics training. Recommend tomorrow to continue lower body dressing training with use of AE to access lowe body. Pt unable to purchase hip kit so  Hip kit was donated to her by rehab department. Patient will benefit from skilled intervention to address the above impairments.   Patients rehabilitation potential is considered to be Good  Factors which may influence rehabilitation potential include:   [x]             None noted  []             Mental ability/status  []             Medical condition  []             Home/family situation and support systems  []             Safety awareness  []             Pain tolerance/management  []             Other:      PLAN :  Recommendations and Planned Interventions:  [x]               Self Care Training                  [x]        Therapeutic Activities  [x]               Functional Mobility Training    []        Cognitive Retraining  [x]               Therapeutic Exercises           [x]        Endurance Activities  [x]               Balance Training                   []        Neuromuscular Re-Education  []               Visual/Perceptual Training     [x]   Home Safety Training  [x]               Patient Education                 [x]        Family Training/Education  []               Other (comment):    Frequency/Duration: Patient will be followed by occupational therapy 5 times a week to address goals. Discharge Recommendations: Home Health  Further Equipment Recommendations for Discharge: hip kit provided to her     SUBJECTIVE:   Patient stated I am feeling nervous about getting up and moving around.     OBJECTIVE DATA SUMMARY:   HISTORY:   Past Medical History:   Diagnosis Date    Arthritis     RA    Autoimmune disease ([facility]     RA    Diabetes ([facility]     Hypertension     CONTOLLED BY MEDS    Other ill-defined conditions(799.89)     abscesses under the right arm     Past Surgical History:   Procedure Laterality Date    ABDOMEN SURGERY PROC UNLISTED      Lap band    HX GASTRIC BYPASS      GASTRIC BAND.  HX GYN      BTL       Prior Level of Function/Environment/Context: pt was independent at home. She does keep left hip IR and prefers hip flexion.     Occupations in which the patient is/was successful, what are the barriers preventing that success:   Performance Patterns (routines, roles, habits, and rituals):   Personal Interests and/or values:   Expanded or extensive additional review of patient history:     Home Situation  Home Environment: Apartment  # Steps to Enter: 2  Rails to Enter: No  One/Two Story Residence: Two story  # of Interior Steps: 14  Interior Rails: Right  Lift Chair Available: No  Living Alone: No  Support Systems: Family member(s)  Patient Expects to be Discharged to[de-identified] Private residence  Current DME Used/Available at Home: Cane, straight  Tub or Shower Type: Tub/Shower combination    Hand dominance: Right    EXAMINATION OF PERFORMANCE DEFICITS:  Cognitive/Behavioral Status:  Neurologic State: Alert  Orientation Level: Oriented X4  Cognition: Appropriate for age attention/concentration  Perception: Appears intact  Perseveration: No perseveration noted  Safety/Judgement: Good awareness of safety precautions    Skin: see nursing notes    Edema: none noted    Hearing: Auditory  Auditory Impairment: None    Vision/Perceptual:                           Acuity: Within Defined Limits         Range of Motion:    AROM: Within functional limits  PROM: Within functional limits                      Strength:    Strength: Within functional limits                Coordination:  Coordination: Within functional limits  Fine Motor Skills-Upper: Right Intact; Left Intact    Gross Motor Skills-Upper: Right Intact; Left Intact    Tone & Sensation:    Tone: Normal  Sensation: Intact                      Balance:  Sitting: Intact  Standing: Impaired; With support  Standing - Static: Good  Standing - Dynamic : Fair    Functional Mobility and Transfers for ADLs:  Bed Mobility:  Supine to Sit: Moderate assistance  Sit to Supine:  (remained sitting in chair)    Transfers:  Sit to Stand: Minimum assistance  Stand to Sit: Minimum assistance  Bed to Chair: Minimum assistance  Toilet Transfer : Minimum assistance    ADL Assessment:  Feeding: Supervision    Oral Facial Hygiene/Grooming: Supervision    Bathing: Moderate assistance    Upper Body Dressing: Supervision    Lower Body Dressing: Moderate assistance    Toileting: Moderate assistance                ADL Intervention and task modifications:      Lower Body Access:  Pt with posterior hip replacement and instructed to complete lower body dressing following posterior hip precautions. Recommend use of hip kit to complete lower body dressing to maximize independence, ensure safe access for posterior hip replacement, and assist with pain control. Pt provided with education for proper  to access lower body while using AE. Pt instructed to avoid trunk flexion and internal rotation with ADL activities. Pt reports independence with training and education. Recommend pt to follow this instruction for 8-12 weeks until cleared by surgeon. Cognitive Retraining  Safety/Judgement: Good awareness of safety precautions    Functional Measure:  Barthel Index:    Bathin  Bladder: 10  Bowels: 10  Groomin  Dressin  Feeding: 10  Mobility: 5  Stairs: 0  Toilet Use: 5  Transfer (Bed to Chair and Back): 10  Total: 60       Barthel and G-code impairment scale:  Percentage of impairment CH  0% CI  1-19% CJ  20-39% CK  40-59% CL  60-79% CM  80-99% CN  100%   Barthel Score 0-100 100 99-80 79-60 59-40 20-39 1-19   0   Barthel Score 0-20 20 17-19 13-16 9-12 5-8 1-4 0      The Barthel ADL Index: Guidelines  1. The index should be used as a record of what a patient does, not as a record of what a patient could do. 2. The main aim is to establish degree of independence from any help, physical or verbal, however minor and for whatever reason. 3. The need for supervision renders the patient not independent. 4. A patient's performance should be established using the best available evidence. Asking the patient, friends/relatives and nurses are the usual sources, but direct observation and common sense are also important. However direct testing is not needed.   5. Usually the patient's performance over the preceding 24-48 hours is important, but occasionally longer periods will be relevant. 6. Middle categories imply that the patient supplies over 50 per cent of the effort. 7. Use of aids to be independent is allowed. Allen Alves., Barthel, D.W. (0015). Functional evaluation: the Barthel Index. [facility] (14)2. Jerry Jacob juma RAUL Ann, Greta Dasilva., Aria Butterfield, [place], [address] [place] [address] (1999). Measuring the change indisability after inpatient rehabilitation; comparison of the responsiveness of the Barthel Index and Functional Hanson Measure. Journal of Neurology, Neurosurgery, and Psychiatry, 66(4), 258-777. GINGER Bhatia, ADITHYA Christianson, & Solis Garrison MKVNG. (2004.) Assessment of post-stroke quality of life in cost-effectiveness studies: The usefulness of the Barthel Index and the EuroQoL-5D. Quality of Life Research, 13, 386-29     G codes: In compliance with CMSs Claims Based Outcome Reporting, the following G-code set was chosen for this patient based on their primary functional limitation being treated: The outcome measure chosen to determine the severity of the functional limitation was the Barthel Index with a score of 60/100 which was correlated with the impairment scale. ? Self Care:     - CURRENT STATUS: CJ - 20%-39% impaired, limited or restricted    - GOAL STATUS: CI - 1%-19% impaired, limited or restricted    - D/C STATUS:  ---------------To be determined---------------     Occupational Therapy Evaluation Charge Determination   History Examination Decision-Making   LOW Complexity : Brief history review  MEDIUM Complexity : 3-5 performance deficits relating to physical, cognitive , or psychosocial skils that result in activity limitations and / or participation restrictions MEDIUM Complexity : Patient may present with comorbidities that affect occupational performnce.  Miniml to moderate modification of tasks or assistance (eg, physical or verbal ) with assesment(s) is necessary to enable patient to complete evaluation       Based on the above components, the patient evaluation is determined to be of the following complexity level: LOW   Pain:  Pain Scale 1: Numeric (0 - 10)  Pain Intensity 1: 8  Pain Location 1: Hip  Pain Orientation 1: Left  Pain Description 1: Aching  Pain Intervention(s) 1: Medication (see MAR)  Activity Tolerance:   VSS throughout session. After treatment:   [x] Patient left in no apparent distress sitting up in chair  [] Patient left in no apparent distress in bed  [x] Call bell left within reach  [x] Nursing notified  [] Caregiver present  [] Bed alarm activated    COMMUNICATION/EDUCATION:   The patients plan of care was discussed with: Physical Therapist and Registered Nurse. [x] Home safety education was provided and the patient/caregiver indicated understanding. [x] Patient/family have participated as able in goal setting and plan of care. [] Patient/family agree to work toward stated goals and plan of care. [] Patient understands intent and goals of therapy, but is neutral about his/her participation. [] Patient is unable to participate in goal setting and plan of care. This patients plan of care is appropriate for delegation to DEAN.     Thank you for this referral.  Rylee Waldron, OT  Time Calculation: 60 mins

## 2018-07-03 NOTE — PROGRESS NOTES
Bedside shift change report given to [address] (oncoming nurse) by Celine Walls (offgoing nurse). Report included the following information SBAR.

## 2018-07-03 NOTE — PROGRESS NOTES
Care Management Interventions  PCP Verified by CM: Yes (Dr. Joseph Carias)  Palliative Care Criteria Met (RRAT>21 & CHF Dx)?: No  Mode of Transport at Discharge: Other (see comment) (family)  Transition of Care Consult (CM Consult): Home Health, Discharge Planning  [address] [address]: No  Reason Outside [place]: Physician referred to specific agency  MyChart Signup: No  Discharge Durable Medical Equipment: No  Health Maintenance Reviewed: Yes  Physical Therapy Consult: Yes  Occupational Therapy Consult: Yes  Speech Therapy Consult: No  Current Support Network: Other (Lives with daughter)  Confirm Follow Up Transport: Family  Plan discussed with Pt/Family/Caregiver: Yes  Freedom of Choice Offered: Yes  [place] Resource Information Provided?: No  Discharge Location  Discharge Placement: Home with home health     Reason for Admission:   LEFT TOTAL HIP ARTHROPLASTY/POSTERIOR                    RRAT Score:  7                   Plan for utilizing home health:    Offered freedom of choice, patient prefers [facility]. Referral sent via [employer]. They have accepted patient. CM updated AVS.                    Likelihood of Readmission:  no                         Transition of Care Plan:   Home with home health. Patient's new address is [address] [place] [address], [address] [facility]. Patient asked that CM update address in hospital system.     TAY BermeoW/CRM

## 2018-07-03 NOTE — PROGRESS NOTES
Problem: Mobility Impaired (Adult and Pediatric)  Goal: *Acute Goals and Plan of Care (Insert Text)  Physical Therapy Goals  Initiated 7/2/2018    1. Patient will move from supine to sit and sit to supine  in bed with modified independence within 4 days. 2. Patient will perform sit to stand with modified independence within 4 days. 3. Patient will ambulate with modified independence for 200 feet with the least restrictive device within 4 days. 4. Patient will ascend/descend 14 stairs with single handrail(s) with supervision/set-up within 4 days. 5. Patient will perform total hip home exercise program per protocol with independence within 4 days. physical Therapy TREATMENT  Patient: Lyly Lorenzo (47 y.o. female)  Date: 7/3/2018  Diagnosis: OSTEO ARTHRITIS LEFT HIP-M16.12  Arthritis of left hip Osteoarthritis of left hip  Procedure(s) (LRB):  LEFT TOTAL HIP ARTHROPLASTY/POSTERIOR (Left) 1 Day Post-Op  Precautions: Fall, WBAT, Total hip (posterior precautions)  Chart, physical therapy assessment, plan of care and goals were reviewed. ASSESSMENT:  Patient received sitting in chair, agreeable to therapy. Continues to require assist and max cues for sit>stand technique. Lacks anterior weightshift and attempts to push straight up making it difficult to get weight over RONY. Patient somewhat improved after multiple attempts and with min A. Gait slow initially with step to pattern but quickly progressed to step through pattern. Increased ambulation distance and fluidity of gait. Returned to room and completed standing exercises as below. Mod A required to assist L LE into bed (practicing bed mobility in/out of bed on patient's L side of bed). She is able to use the strap to assist LE but not able to do this independently and maintain proper alignment without breaking precautions. Also has increased pain with bed mobility and transfers due to sciatica on L.  Will need to improve bed mobility, transfers, and complete stair training prior to d/c home with HHPT. Progression toward goals:  [x]      Improving appropriately and progressing toward goals  []      Improving slowly and progressing toward goals  []      Not making progress toward goals and plan of care will be adjusted     PLAN:  Patient continues to benefit from skilled intervention to address the above impairments. Continue treatment per established plan of care. Discharge Recommendations:  Home Health  Further Equipment Recommendations for Discharge: Owns RW     SUBJECTIVE:   Patient stated This is amazing.  during walking and exercises  The patient stated 3/3 hip precautions. Reviewed all 3 with patient. OBJECTIVE DATA SUMMARY:   Critical Behavior:  Neurologic State: Alert  Orientation Level: Oriented X4  Cognition: Appropriate for age attention/concentration  Safety/Judgement: Good awareness of safety precautions  Functional Mobility Training:  Bed Mobility:  Supine to Sit: Moderate assistance  Sit to Supine: Moderate assistance (to manage L LE, use of belt )  Scooting: Contact guard assistance  Transfers:  Sit to Stand: Minimum assistance; Moderate assistance  Stand to Sit: Contact guard assistance  Bed to Chair: Contact guard assistance  Balance:  Sitting: Intact  Standing: Intact; With support  Standing - Static: Good  Standing - Dynamic : Fair  Ambulation/Gait Training:  Distance (ft): 250 Feet (ft)  Assistive Device: Walker, rolling;Gait belt  Ambulation - Level of Assistance: Contact guard assistance;Stand-by assistance  Gait Abnormalities: Antalgic;Decreased step clearance  Left Side Weight Bearing: As tolerated  Base of Support: Widened  Stance: Left decreased  Speed/Serenity: Pace decreased (<100 feet/min)  Step Length: Right shortened;Left shortened  Therapeutic Exercises:     STANDING  EXERCISES   Sets   Reps   Active Active Assist   Passive Self ROM   Comments   Heel Raises 1 10 [x]                                           [] []                                           []                                              Hip Abduction 1 10 [x]                                           []                                           []                                           []                                              Hamstring Curl 1 10 [x]                                           []                                           []                                           []                                                Pain:  Pain Scale 1: Numeric (0 - 10)  Pain Intensity 1: 8  Pain Location 1: Hip  Pain Orientation 1: Left  Pain Description 1: Aching  Pain Intervention(s) 1: Medication (see MAR)  Activity Tolerance:   Good  Please refer to the flowsheet for vital signs taken during this treatment.   After treatment:   []  Patient left in no apparent distress sitting up in chair  [x]  Patient left in no apparent distress in bed  [x]  Call bell left within reach  [x]  Nursing notified  [x]  Caregiver present  []  Bed alarm activated    COMMUNICATION/COLLABORATION:   The patients plan of care was discussed with: Occupational Therapist and Registered Nurse    Zaynab Anderson PT, DPT   Time Calculation: 24 mins

## 2018-07-03 NOTE — PROGRESS NOTES
Complaints: none  Events: none    VSS / AFEBRILE      GEN:  NAD. AOx3   ABD:  S/NT/ND   LLE:  Dressing C/D/I    5/5 motor    Calf nttp (Bilat)    Sensate all distribution to light touch    1+ dp/pt pulses, foot perfused      Lab Results   Component Value Date/Time    HGB 9.1 (L) 07/03/2018 05:30 AM    INR 1.1 07/03/2018 05:30 AM       Lab Results   Component Value Date/Time    Sodium 140 07/03/2018 05:30 AM    Potassium 3.4 (L) 07/03/2018 05:30 AM    Chloride 107 07/03/2018 05:30 AM    CO2 26 07/03/2018 05:30 AM    BUN 12 07/03/2018 05:30 AM    Creatinine 1.11 (H) 07/03/2018 05:30 AM    Calcium 8.2 (L) 07/03/2018 05:30 AM            POD #1 LEFT TOTAL HIP REPLACEMENT. Satisfactory progress. Patient is doing well. Her pain is already improved compared to prior to surgery. She is complaining of sciatica which she had prior to surgery. Plan for discharge tomorrow if PT goals are met. ABX: Complete today  PATHWAY: D/C Susan per protocol  DVT Prophylaxis: Coumadin (adjusted per pharmacy)   Weight Bearing: WBAT LLE, posterior hip precautions  Pain Control: PRN oral narcotics, celebrex  Anticipated Discharge Date: tomorrow  Disposition: Home, [facility].

## 2018-07-03 NOTE — PROGRESS NOTES
Problem: Mobility Impaired (Adult and Pediatric)  Goal: *Acute Goals and Plan of Care (Insert Text)  Physical Therapy Goals  Initiated 7/2/2018    1. Patient will move from supine to sit and sit to supine  in bed with modified independence within 4 days. 2. Patient will perform sit to stand with modified independence within 4 days. 3. Patient will ambulate with modified independence for 200 feet with the least restrictive device within 4 days. 4. Patient will ascend/descend 14 stairs with single handrail(s) with supervision/set-up within 4 days. 5. Patient will perform total hip home exercise program per protocol with independence within 4 days. physical Therapy TREATMENT  Patient: Lizbeth Bledsoe (83 y.o. female)  Date: 7/3/2018  Diagnosis: OSTEO ARTHRITIS LEFT HIP-M16.12  Arthritis of left hip <principal problem not specified>  Procedure(s) (LRB):  LEFT TOTAL HIP ARTHROPLASTY/POSTERIOR (Left) 1 Day Post-Op  Precautions: Fall, WBAT, Total hip (posterior precautions)  Chart, physical therapy assessment, plan of care and goals were reviewed. ASSESSMENT:  Patient received supine in bed, agreeable to therapy and wishing to transfer to [facility]. She was able to initiate mobility to EOB but required min A with L LE management and positioning, as patient had tendency to internally rotate L hip when attempting to scoot. Difficulty with righting trunk to sitting. After stand pivot to [facility], patient with difficulty standing from [facility], poor anterior weightshift despite verbal cues and demonstration. Gait slow and antalgic with step to pattern. Returned to chair. Recommend HHPT. Patient educated on precautions and required physical assist for positioning L LE in neutral when sitting (preference for L hip IR), and pillow placed between knees in sitting. Patient most limited throughout session by L sided sciatic pain with all bed mobility and transfers. This was somewhat relieved with gait.  Anticipate d/c tomorrow. Progression toward goals:  [x]      Improving appropriately and progressing toward goals  []      Improving slowly and progressing toward goals  []      Not making progress toward goals and plan of care will be adjusted     PLAN:  Patient continues to benefit from skilled intervention to address the above impairments. Continue treatment per established plan of care. Discharge Recommendations:  Home Health  Further Equipment Recommendations for Discharge: Owns RW     SUBJECTIVE:   Patient stated The sciatic pain is the worst.  The patient stated 2/3 hip precautions. Reviewed all 3 with patient. OBJECTIVE DATA SUMMARY:   Critical Behavior:  Neurologic State: Alert  Orientation Level: Oriented X4  Cognition: Appropriate decision making, Appropriate for age attention/concentration, Appropriate safety awareness, Follows commands  Functional Mobility Training:  Bed Mobility:  Supine to Sit: Minimum assistance; Additional time;Assist x1  Transfers:  Sit to Stand: Minimum assistance  Stand to Sit: Minimum assistance  Balance:  Sitting: Intact  Standing: Impaired; With support  Standing - Static: Good  Standing - Dynamic : Fair  Ambulation/Gait Training:  Distance (ft): 50 Feet (ft)  Assistive Device: Walker, rolling;Gait belt  Ambulation - Level of Assistance: Contact guard assistance  Gait Abnormalities: Antalgic;Decreased step clearance; Step to gait  Left Side Weight Bearing: As tolerated  Base of Support: Widened  Stance: Left decreased  Speed/Serenity: Pace decreased (<100 feet/min)  Step Length: Right shortened;Left shortened  Pain:  Pain Scale 1: Numeric (0 - 10)  Pain Intensity 1: 6  Pain Location 1: Hip  Pain Orientation 1: Left  Pain Description 1: Aching  Pain Intervention(s) 1: Medication (see MAR); Cold pack  Activity Tolerance:   Limited by sciatic pain  Please refer to the flowsheet for vital signs taken during this treatment.   After treatment:   [x]  Patient left in no apparent distress sitting up in chair with pillow placed   []  Patient left in no apparent distress in bed  [x]  Call bell left within reach  [x]  Nursing notified  []  Caregiver present  []  Bed alarm activated    COMMUNICATION/COLLABORATION:   The patients plan of care was discussed with: Occupational Therapist and Registered Nurse    Judy Kan, PT, DPT   Time Calculation: 23 mins

## 2018-07-03 NOTE — DIABETES MGMT
DTC Progress Note    Recommendations/ Comments: Chart reviewed due to variable blood sugars. Blood sugar ranging from  mg/dl. If blood sugars remain >180 and pt's po intake is at least 50% of her meal tray, may consider Glipizide 2.5 mg at breakfast. DTC to continue to follow. Current hospital DM medication: correction scale Humalog, normal sensitivity. Chart reviewed on Jamee Carr. Patient is a 45 y.o. female with known diabetes on Janumet  mg BID and Glipizide 5 mg BIDat home. A1c:   Lab Results   Component Value Date/Time    Hemoglobin A1c 6.4 (H) 06/21/2018 12:31 PM       Recent Glucose Results:   Lab Results   Component Value Date/Time     (H) 07/03/2018 05:30 AM    GLUCPOC 210 (H) 07/03/2018 12:21 PM    GLUCPOC 155 (H) 07/03/2018 06:37 AM    GLUCPOC 110 (H) 07/02/2018 09:08 PM        Lab Results   Component Value Date/Time    Creatinine 1.11 (H) 07/03/2018 05:30 AM     Estimated Creatinine Clearance: 58.1 mL/min (based on Cr of 1.11). Active Orders   Diet    DIET DIABETIC CONSISTENT CARB Regular        PO intake: No data found. Will continue to follow as needed.     Thank you  Jacqueline Dai RD, CDE

## 2018-07-03 NOTE — PROGRESS NOTES
Pharmacist Note - Warfarin Dosing  Consult provided for this 45 y.o. female to manage warfarin for DVT PPX s/p LTH    INR Goal: 1.7- 2.2    Preop Dose: Dobzyniak- none    Drugs that may increase INR: None  Drugs that may decrease INR: None  Other current anticoagulants/ drugs that may increase bleeding risk: NSAID  Risk factors: None  Daily INR ordered: YES    Recent Labs      07/03/18   0530   HGB  9.1*   INR  1.1       Date               INR                 Dose  6/21  1  --  7/2  --  5 mg  7/3  1.1  5 mg    Assessment/ Plan: Will order warfarin 5 mg PO x 1 dose. Pharmacy will continue to monitor daily and adjust therapy as indicated.

## 2018-07-03 NOTE — PROGRESS NOTES
Bedside and Verbal shift change report given to Kavon Dockery RN (oncoming nurse) by Treasure Montez RN (offgoing nurse). Report included the following information SBAR, Kardex, OR Summary, Procedure Summary, Intake/Output, MAR and Recent Results.

## 2018-07-04 VITALS
TEMPERATURE: 98.9 F | DIASTOLIC BLOOD PRESSURE: 78 MMHG | RESPIRATION RATE: 16 BRPM | HEART RATE: 96 BPM | OXYGEN SATURATION: 99 % | SYSTOLIC BLOOD PRESSURE: 119 MMHG | WEIGHT: 139.99 LBS | BODY MASS INDEX: 20.73 KG/M2 | HEIGHT: 69 IN

## 2018-07-04 LAB
GLUCOSE BLD STRIP.AUTO-MCNC: 161 MG/DL (ref 65–100)
INR BLD: 1 (ref 0.9–1.2)
SERVICE CMNT-IMP: ABNORMAL

## 2018-07-04 PROCEDURE — 74011250637 HC RX REV CODE- 250/637: Performed by: PHYSICIAN ASSISTANT

## 2018-07-04 PROCEDURE — 74011250637 HC RX REV CODE- 250/637: Performed by: ORTHOPAEDIC SURGERY

## 2018-07-04 PROCEDURE — 82962 GLUCOSE BLOOD TEST: CPT

## 2018-07-04 PROCEDURE — 97535 SELF CARE MNGMENT TRAINING: CPT

## 2018-07-04 PROCEDURE — 97530 THERAPEUTIC ACTIVITIES: CPT

## 2018-07-04 PROCEDURE — 74011250637 HC RX REV CODE- 250/637: Performed by: NURSE PRACTITIONER

## 2018-07-04 PROCEDURE — 97116 GAIT TRAINING THERAPY: CPT

## 2018-07-04 PROCEDURE — 85610 PROTHROMBIN TIME: CPT

## 2018-07-04 RX ADMIN — OXYCODONE HYDROCHLORIDE 10 MG: 5 TABLET ORAL at 03:05

## 2018-07-04 RX ADMIN — WARFARIN SODIUM 6 MG: 5 TABLET ORAL at 09:45

## 2018-07-04 RX ADMIN — ACETAMINOPHEN 1000 MG: 500 TABLET, FILM COATED ORAL at 08:13

## 2018-07-04 RX ADMIN — OXYCODONE HYDROCHLORIDE 5 MG: 5 TABLET ORAL at 08:12

## 2018-07-04 RX ADMIN — POTASSIUM CHLORIDE 10 MEQ: 750 TABLET, EXTENDED RELEASE ORAL at 08:37

## 2018-07-04 RX ADMIN — ACETAMINOPHEN 1000 MG: 500 TABLET, FILM COATED ORAL at 03:04

## 2018-07-04 RX ADMIN — ATENOLOL 100 MG: 25 TABLET ORAL at 09:44

## 2018-07-04 RX ADMIN — METFORMIN HYDROCHLORIDE: 500 TABLET, FILM COATED ORAL at 08:00

## 2018-07-04 RX ADMIN — SENNOSIDES AND DOCUSATE SODIUM 1 TABLET: 8.6; 5 TABLET ORAL at 08:37

## 2018-07-04 RX ADMIN — GLIPIZIDE 5 MG: 5 TABLET, FILM COATED, EXTENDED RELEASE ORAL at 07:30

## 2018-07-04 RX ADMIN — POLYETHYLENE GLYCOL 3350 17 G: 17 POWDER, FOR SOLUTION ORAL at 08:37

## 2018-07-04 RX ADMIN — Medication 10 ML: at 07:47

## 2018-07-04 RX ADMIN — CELECOXIB 200 MG: 200 CAPSULE ORAL at 08:37

## 2018-07-04 NOTE — PROGRESS NOTES
Problem: Mobility Impaired (Adult and Pediatric)  Goal: *Acute Goals and Plan of Care (Insert Text)  Physical Therapy Goals  Initiated 7/2/2018    1. Patient will move from supine to sit and sit to supine  in bed with modified independence within 4 days. 2. Patient will perform sit to stand with modified independence within 4 days. 3. Patient will ambulate with modified independence for 200 feet with the least restrictive device within 4 days. 4. Patient will ascend/descend 14 stairs with single handrail(s) with supervision/set-up within 4 days. 5. Patient will perform total hip home exercise program per protocol with independence within 4 days. physical Therapy TREATMENT  Patient: Priya Peck (72 y.o. female)  Date: 7/4/2018  Diagnosis: OSTEO ARTHRITIS LEFT HIP-M16.12  Arthritis of left hip Osteoarthritis of left hip  Procedure(s) (LRB):  LEFT TOTAL HIP ARTHROPLASTY/POSTERIOR (Left) 2 Days Post-Op  Precautions: Fall, WBAT, Total hip (posterior precautions)  Chart, physical therapy assessment, plan of care and goals were reviewed. ASSESSMENT:  Patient cleared by RN for continued mobility with PT. Patient eager and anxious to participate. Her biggest worry is going up 2 steps into her home with no railings. Bed mobility much improved this date and patient able to achieve EOB sitting with supervision, additional time, and able to maintain hip precautions. Able to stand and ambulate 100 ft with mod I using RW. Completed stair assessment with and without railings. Patient able to confidently ascend/descend 4 stairs with cane and HHA. Returned to room with all needs met. She has met acute care physical therapy goals and is cleared by therapy to d/c home with continued HHPT vs Outpatient PT services at physician discretion. Nursing has been notified.    Progression toward goals:  [x]      Improving appropriately and progressing toward goals  []      Improving slowly and progressing toward goals  []      Not making progress toward goals and plan of care will be adjusted     PLAN:  Patient continues to benefit from skilled intervention to address the above impairments. Continue treatment per established plan of care. Discharge Recommendations:  Home Health and Outpatient  Further Equipment Recommendations for Discharge:  Has equipment     SUBJECTIVE:   Patient stated Le Evans bekc for your patience.   The patient stated 3/3 hip precautions. Reviewed all 3 with patient. OBJECTIVE DATA SUMMARY:   Critical Behavior:  Neurologic State: Alert  Orientation Level: Oriented X4  Cognition: Appropriate for age attention/concentration  Safety/Judgement: Good awareness of safety precautions  Functional Mobility Training:  Bed Mobility:     Supine to Sit: Contact guard assistance; Additional time     Scooting: Independent        Transfers:  Sit to Stand: Contact guard assistance; Additional time; Adaptive equipment  Stand to Sit: Contact guard assistance; Additional time; Adaptive equipment                             Balance:  Sitting: Intact  Standing: Intact; With support  Standing - Static: Good  Standing - Dynamic : Good  Ambulation/Gait Training:                 Gait Abnormalities: Antalgic;Decreased step clearance        Base of Support: Widened;Shift to right  Stance: Left decreased  Speed/Serenity: Slow  Step Length: Right shortened;Left shortened            Pain:  Pain Scale 1: Numeric (0 - 10)  Pain Intensity 1: 3  Pain Location 1: Hip  Pain Orientation 1: Left  Pain Description 1: Aching  Pain Intervention(s) 1: Medication (see MAR); Cold pack  Activity Tolerance:   VSS  Please refer to the flowsheet for vital signs taken during this treatment.   After treatment:   [x]  Patient left in no apparent distress sitting up in chair  []  Patient left in no apparent distress in bed  [x]  Call bell left within reach  [x]  Nursing notified  [x]  Caregiver present  []  Bed alarm activated    COMMUNICATION/COLLABORATION:   The patients plan of care was discussed with: Occupational Therapist and Registered Nurse    Francisca Marvin, PT, DPT   Time Calculation: 24 mins

## 2018-07-04 NOTE — PROGRESS NOTES
Bedside and Verbal shift change report given to Heather Drake (oncoming nurse) by Melody Araiza RN (offgoing nurse). Report given with Gregory SIDDIQUI and MAR.

## 2018-07-04 NOTE — DISCHARGE SUMMARY
@9BSID@ [address] [place] [address]   [address] [address]    DISCHARGE SUMMARY     Patient: Claudia Salmeron                             Medical Record Number: [id number]                : 1965  Age: 45 y.o. Admit Date: 2018  Discharge Date:   Admission Diagnosis: OSTEO ARTHRITIS LEFT HIP-M16.12  Arthritis of left hip  Discharge Diagnosis: OSTEO ARTHRITIS LEFT HIP-M16.12  Procedures: Procedure(s):  LEFT TOTAL HIP ARTHROPLASTY/POSTERIOR  Surgeon: Melanie Chapman  Assistants: vanessa  Anesthesia: spinal  Complications: None     History of Present Illness:  Claudia Salmeron is a 45 y.o. female with a history of Left hip pain, swelling, and marked loss of function. Despite conservative management and after clinical and radiographic evaluation, it was determined that she suffered from end-stage osteoarthritis and would benefit from Procedure(s):  LEFT TOTAL HIP ARTHROPLASTY/POSTERIOR, which she consented to undergo after a discussion of the risks, benefits, alternatives, rehab concerns, and potential complications of surgery. Hospital Course:  Shannan Shane tolerated the procedure well. She was transferred  to the recovery room in stable condition. After a brief stay the patient was then transferred to the Joint Replacement Unit at [address] [place] [address].  On postoperative day #1, the dressing was clean and dry, she was neurovascularly intact. The patient was afebrile and vital signs were stable. Calves were soft and non-tender bilaterally. On postoperative day  # 2, the patient was tolerating a regular diet and making satisfactory progress with physical therapy.   Hemoglobin and INR prior to discharge were   Lab Results   Component Value Date/Time    HGB 9.1 (L) 2018 05:30 AM    INR 1.1 2018 05:30 AM    INR (POC) 1.0 2018 07:45 AM   .  Claudia Salmeron made satisfactory progress with physical therapy and was discharged to Home in stable condition on postoperative day 2. She was provided with routine postoperative instructions and advised to follow up in my office in 3 weeks following discharge from the hospital.  She was prescribed Coumadin for DVT prophylaxis and oxycodone for post-operative pain. Discharge Medications:  Current Discharge Medication List      START taking these medications    Details   oxyCODONE IR (ROXICODONE) 5 mg immediate release tablet Take 1 Tab by mouth every four (4) hours as needed. Max Daily Amount: 30 mg.  Qty: 80 Tab, Refills: 0    Associated Diagnoses: Arthritis of left hip      warfarin (COUMADIN) 2 mg tablet Take 1 Tab by mouth daily. Indications: DEEP VEIN THROMBOSIS PREVENTION  Qty: 30 Tab, Refills: 1         CONTINUE these medications which have NOT CHANGED    Details   atorvastatin (LIPITOR) 40 mg tablet Take 40 mg by mouth daily (with breakfast). SITagliptin-metFORMIN (JANUMET)  mg per tablet Take 1 Tab by mouth two (2) times daily (with meals). BREAKFAST & LUNCH      losartan-hydroCHLOROthiazide (HYZAAR) 100-25 mg per tablet Take 1 Tab by mouth daily. glipiZIDE (GLUCOTROL) 10 mg tablet Take 5 mg by mouth two (2) times a day. TAKES IN AM, & AT LUNCH TIME-1-2 PM      amLODIPine (NORVASC) 5 mg tablet TAKE ONE TABLET BY MOUTH ONCE DAILY  Qty: 30 Tab, Refills: 0      atenolol (TENORMIN) 100 mg tablet TAKE ONE TABLET BY MOUTH ONCE DAILY ** NEED TO MAKE APPT**  Qty: 30 Tab, Refills: 0    Comments: She needs to be seen ASAP      acetaminophen (TYLENOL EXTRA STRENGTH) 500 mg tablet Take 1,000 mg by mouth daily. cyclobenzaprine (FLEXERIL) 5 mg tablet Take 1 Tab by mouth three (3) times daily as needed for Muscle Spasm(s).   Qty: 20 Tab, Refills: 0         STOP taking these medications       mupirocin (BACTROBAN) 2 % ointment Comments:   Reason for Stopping:         traMADol (ULTRAM) 50 mg tablet Comments:   Reason for Stopping:         methotrexate (RHEUMATREX) 2.5 mg tablet Comments:   Reason for Stopping: predniSONE (DELTASONE) 10 mg tablet Comments:   Reason for Stopping:         INFLIXIMAB (REMICADE IV) Comments:   Reason for Stopping:               Signed by: Kolby Aldridge MD  7/4/2018

## 2018-07-04 NOTE — PROGRESS NOTES
Problem: Self Care Deficits Care Plan (Adult)  Goal: *Acute Goals and Plan of Care (Insert Text)  Occupational Therapy Goals  Initiated: 7/3/2018     1. Patient will perform grooming with supervision/set-up standing at sink within 3 day(s). 2.  Patient will perform bathing with supervision/set-up from chair within 3 day(s). 3.  Patient will perform upper body dressing and lower body dressing with supervision/set-up within 3 day(s). 4.  Patient will perform toilet transfers with supervision/set-up within 3 day(s). 5.  Patient will perform all aspects of toileting with supervision/set-up within 3 day(s). 6.  Patient will be independent with hip precautions within 3 days. Occupational Therapy TREATMENT/DISCHARGE  Patient: Aemlia Cope (89 y.o. female)  Date: 7/4/2018  Diagnosis: OSTEO ARTHRITIS LEFT HIP-M16.12  Arthritis of left hip Osteoarthritis of left hip  Procedure(s) (LRB):  LEFT TOTAL HIP ARTHROPLASTY/POSTERIOR (Left) 2 Days Post-Op  Precautions: Fall, WBAT, Total hip (posterior precautions)  Chart, occupational therapy assessment, plan of care, and goals were reviewed. ASSESSMENT:  Patient presents supine in bed, alert and agreeable to therapy. Patient able to recall 2/3 posterior hip precautions. Reviewed all 3 and provided patient with handout on precautions, patient demonstrates understanding. Also reviewed ADL modifications to maintain precautions. Patient required min-A for supine<>sit with HOB flat and rail lowered, practiced using gait belt for LLE bed mobility. Also practiced using dressing stick to doff socks and sock aide to don socks. Patient declined practicing underwear/ pants but was receptive to instruction using AE. Patient reports no difficulty ambulating to bathroom and performing toileting/ toilet transfer/ grooming at sink and declined additional practice.   Patient has hip kit, raised toilet seat, and gait belt for home use, and her adult daughter will providing assistance as needed. Recommend HHOT for emphasis of precautions, further IADl training and education, safety, functional mobility, and body mechanics training in home environment. Progression toward goals:  [x]            Improving appropriately and progressing toward goals  []            Improving slowly and progressing toward goals  []            Not making progress toward goals and plan of care will be adjusted     PLAN:  Patient will be discharged from occupational therapy at this time. Rationale for discharge:  [x]   Goals Achieved  []   [address]  []   Patient not participating in therapy  []   Other:  Discharge Recommendations:  Home Health OT  Further Equipment Recommendations for Discharge: none     SUBJECTIVE:   Patient stated I'm ready to go home.     OBJECTIVE DATA SUMMARY:   Cognitive/Behavioral Status:  Neurologic State: Alert  Orientation Level: Oriented X4  Cognition: Appropriate decision making; Appropriate for age attention/concentration; Appropriate safety awareness; Follows commands  Perception: Appears intact  Perseveration: No perseveration noted  Safety/Judgement: Awareness of environment; Insight into deficits;Good awareness of safety precautions    Functional Mobility and Transfers for ADLs:  Bed Mobility:  Supine to Sit: Minimum assistance (with HOB flat and rail lowered)  Sit to supine: Minimum assistance (for raising LLE into bed, using gait belt)  Scooting: Modified independent (laterally, using bed rails)    Balance:  Sitting: Intact  Standing: Intact; With support  Standing - Static: Good  Standing - Dynamic : Good    ADL Intervention:     Lower Body Dressing Assistance  Socks: Supervision/set-up (doffed with dressing stick, donned with sock aide)  Position Performed: Seated edge of bed         Cognitive Retraining  Safety/Judgement: Awareness of environment; Insight into deficits;Good awareness of safety precautions      Pain:  Pain Scale 1: Numeric (0 - 10)  Pain Intensity 1: 3  Pain Location 1: Hip  Pain Orientation 1: Left  Pain Description 1: Aching  Pain Intervention(s) 1: Medication (see MAR); Cold pack     Activity Tolerance:   VSS    After treatment:   [] Patient left in no apparent distress sitting up in chair  [x] Patient left in no apparent distress in bed  [x] Call bell left within reach  [x] Nursing notified  [] Caregiver present  [] Bed alarm activated    COMMUNICATION/COLLABORATION:   The patients plan of care was discussed with: Physical Therapist and Registered Nurse    Carroll Cui OT  Time Calculation: 24 mins

## 2018-07-04 NOTE — PROGRESS NOTES
The Joint Replacement Discharge Education video was reviewed by the patient/family. The content was discussed utilizing teach back, questions were answered. The patient verbalized an understanding of the instructions.

## 2018-07-04 NOTE — DISCHARGE INSTRUCTIONS
Discharge Instructions Posterior Approach   Hip Replacement-Dr. Chinyere Baldwin      Patient Name: Amelia Cope  Date of procedure:7/2/2018  Procedure:Procedure(s):  LEFT TOTAL HIP ARTHROPLASTY/POSTERIOR  Surgeon: Surgeon(s) and Role:     * Rannie Goodpasture, MD - Primary     PCP: Philomena Haley MD  Date of discharge: No discharge date for patient encounter. Follow up appointments   Follow up with Dr. Chinyere Baldwin in 3 weeks. Call [phone] to make an appointment.  If home health has been arranged for you the agency will contact you to arrange dates/times for visits. Please call them if you do not hear from them within 24 hours after you are discharged    When to call your Orthopaedic Surgeon: Call [phone]. If you need to reach us after 5pm or on a weekend; call [phone] and the on call physician will be contacted   Increased hip pain, unrelieved pain or if you have difficulty or are unable to walk   Signs of infection-if your incision is red; continues to have drainage; drainage has a foul odor or if you have a persistent fever over 101 degrees   Signs of a blood clot in your leg-calf pain, tenderness, redness, swelling of lower leg    When to call your Primary Care Physician:   Concerns about medical conditions such as diabetes, high blood pressure, asthma, congestive heart failure   Call if blood sugars are elevated, persistent headache or dizziness, coughing or congestion, constipation or diarrhea, burning with urination, abnormal heart rate    When to call 911and go to the nearest emergency room   Acute onset of chest pain, shortness of breath, difficulty breathing      Activity   Weight bearing as tolerated with walker or crutches.  Refer to pages 23-33 of your handbook for instructions and pictures   Complete your Home Exercise Program daily as instructed by your therapist.  Refer to pages 36-42 of your handbook for instructions and pictures   Get up every one hour and walk (except at night when sleeping)    Maintain hip dislocation precautions for 6 weeks.    -do not cross your legs   -do not roll your surgery leg inward   -do not bend more than 90 degrees   Do not drive or operate heavy machinery    Incision Care   The Aquacel (brown, waterproof) surgical dressing is to remain on your knee for 7 days. On the 7th day have someone gently peel the dressing off by carefully lifting the edge and stretching it slightly to break the adhesive seal and leave incision open to air. You may take a shower with the Aquacel dressing in place.  You do have staples in your Hip incision; if present they will be removed by the Home Health staff in 10-14 days and steri-strips will be applied. Leave the steri-strips on until they fall off    Preventing blood clots     Take Coumadin as prescribed. Pain management   Take pain medication as prescribed; decrease the amount you use as your pain lessens   Avoid alcoholic beverages while taking pain medication   Do not take any over-the-counter medication except for Tylenol (acetaminophen)   Please be aware that many medications contain Tylenol. We do not want you to over medicate so please read the information below as a guide. Do not take more than 3 Grams of Tylenol in a 24 hour period. (There are 1000 milligrams in one Gram)  o 325 mg of Tylenol per tablet (do not take more than 9 tablets in 24 hours)  o 500 mg of Tylenol per tablet (do not take more than 6 tablets in 24 hours)  o    You may place an ice bag on your hip for 15-20 minutes after exercising and as needed throughout the day and night    Diet   Resume usual diet; drink plenty of fluids; eat foods high in fiber   You may want to take a stool softener (such as Senokot-S or Colace) to prevent constipation while you are taking pain medication.   If constipation occurs, take a laxative (such as Dulcolax tablets, Milk of Magnesia, or a suppository)

## 2018-07-04 NOTE — PROGRESS NOTES
Pharmacist Note - Warfarin Dosing  Consult provided for this 45 y.o. female to manage warfarin for DVT PPX s/p LTH    INR Goal: 1.7- 2.2    Preop Dose: Dobzyniak- none    Drugs that may increase INR: None  Drugs that may decrease INR: None  Other current anticoagulants/ drugs that may increase bleeding risk: NSAID  Risk factors: None  Daily INR ordered: YES    Recent Labs      07/04/18   0745  07/03/18   0530   HGB   --   9.1*   INR  1.0  1.1       Date               INR                 Dose  6/21  1.0  --  7/02  --  5 mg  7/03  1.1  5 mg  7/04  1.0  6 mg    Assessment/ Plan: Will order warfarin 6 mg PO x 1 dose prior to discharge Today at 10 AM.    Pharmacy will continue to monitor daily and adjust therapy as indicated.

## 2019-04-10 ENCOUNTER — APPOINTMENT (OUTPATIENT)
Dept: GENERAL RADIOLOGY | Age: 54
End: 2019-04-10
Attending: EMERGENCY MEDICINE
Payer: COMMERCIAL

## 2019-04-10 ENCOUNTER — HOSPITAL ENCOUNTER (EMERGENCY)
Age: 54
Discharge: HOME | End: 2019-04-10
Attending: EMERGENCY MEDICINE
Payer: COMMERCIAL

## 2019-04-10 VITALS
OXYGEN SATURATION: 100 % | RESPIRATION RATE: 18 BRPM | DIASTOLIC BLOOD PRESSURE: 77 MMHG | TEMPERATURE: 97.9 F | SYSTOLIC BLOOD PRESSURE: 182 MMHG | HEART RATE: 67 BPM

## 2019-04-10 DIAGNOSIS — S80.02XA CONTUSION OF LEFT KNEE, INITIAL ENCOUNTER: Primary | ICD-10-CM

## 2019-04-10 DIAGNOSIS — M54.50 ACUTE BILATERAL LOW BACK PAIN WITHOUT SCIATICA: ICD-10-CM
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
## 2019-04-10 PROCEDURE — 72050 X-RAY EXAM NECK SPINE 4/5VWS: CPT

## 2019-04-10 PROCEDURE — 74011250637 HC RX REV CODE- 250/637: Performed by: EMERGENCY MEDICINE

## 2019-04-10 PROCEDURE — 72052 X-RAY EXAM NECK SPINE 6/>VWS: CPT

## 2019-04-10 PROCEDURE — 73562 X-RAY EXAM OF KNEE 3: CPT

## 2019-04-10 PROCEDURE — 72100 X-RAY EXAM L-S SPINE 2/3 VWS: CPT

## 2019-04-10 PROCEDURE — 99284 EMERGENCY DEPT VISIT MOD MDM: CPT

## 2019-04-10 RX ORDER — METHOCARBAMOL 500 MG/1
750 TABLET, FILM COATED ORAL
Status: COMPLETED | OUTPATIENT
Start: 2019-04-10 | End: 2019-04-10

## 2019-04-10 RX ORDER — METHOCARBAMOL 500 MG/1
500 TABLET, FILM COATED ORAL
Qty: 6 TAB | Refills: 0 | Status: SHIPPED | OUTPATIENT
Start: 2019-04-10 | End: 2019-06-20

## 2019-04-10 RX ORDER — IBUPROFEN 600 MG/1
600 TABLET ORAL
Status: DISCONTINUED | OUTPATIENT
Start: 2019-04-10 | End: 2019-04-10 | Stop reason: HOSPADM

## 2019-04-10 RX ADMIN — METHOCARBAMOL 750 MG: 500 TABLET ORAL at 09:58

## 2019-04-10 NOTE — LETTER
[address] 
[address] [place], [place] [address] [place] [facility] [place] [phone] 
[phone] Work/School Note Date: 4/10/2019 To Whom It May concern: 
 
Dev Rocha was seen and treated today in the emergency room by the following provider(s): 
Attending Provider: Gregg Barnard DO Physician Assistant: Khadar Burgos PA-C. Dev Rocha may return to work on 4/13/19.  
 
Sincerely, 
 
 
 
 
Sally Peter PA-C

## 2019-04-10 NOTE — ED NOTES
Instructions on use of crutches discussed with pt by this RN. Discharge instructions reviewed and discussed with pt my provider. Pt wheeled out of department with family member.
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
## 2019-06-20 ENCOUNTER — HOSPITAL ENCOUNTER (INPATIENT)
Age: 54
LOS: 3 days | Discharge: HOME | DRG: 690 | End: 2019-06-23
Attending: EMERGENCY MEDICINE | Admitting: INTERNAL MEDICINE
Payer: COMMERCIAL

## 2019-06-20 ENCOUNTER — APPOINTMENT (OUTPATIENT)
Dept: CT IMAGING | Age: 54
DRG: 690 | End: 2019-06-20
Attending: PHYSICIAN ASSISTANT
Payer: COMMERCIAL

## 2019-06-20 DIAGNOSIS — E87.1 HYPONATREMIA: ICD-10-CM

## 2019-06-20 DIAGNOSIS — N17.9 AKI (ACUTE KIDNEY INJURY) (HCC): ICD-10-CM

## 2019-06-20 DIAGNOSIS — E87.6 HYPOKALEMIA: ICD-10-CM

## 2019-06-20 DIAGNOSIS — N12 PYELONEPHRITIS: Primary | ICD-10-CM

## 2019-06-20 PROBLEM — R11.10 VOMITING: Status: ACTIVE | Noted: 2019-06-20

## 2019-06-20 LAB
ALBUMIN SERPL-MCNC: 2.3 G/DL (ref 3.5–5)
ALBUMIN SERPL-MCNC: 2.8 G/DL (ref 3.5–5)
ALBUMIN/GLOB SERPL: 0.5 {RATIO} (ref 1.1–2.2)
ALBUMIN/GLOB SERPL: 0.5 {RATIO} (ref 1.1–2.2)
ALP SERPL-CCNC: 80 U/L (ref 45–117)
ALP SERPL-CCNC: 98 U/L (ref 45–117)
ALT SERPL-CCNC: 15 U/L (ref 12–78)
ALT SERPL-CCNC: 20 U/L (ref 12–78)
AMORPH CRY URNS QL MICRO: ABNORMAL
ANION GAP SERPL CALC-SCNC: 10 MMOL/L (ref 5–15)
ANION GAP SERPL CALC-SCNC: 13 MMOL/L (ref 5–15)
APPEARANCE UR: ABNORMAL
AST SERPL-CCNC: 21 U/L (ref 15–37)
AST SERPL-CCNC: 26 U/L (ref 15–37)
BACTERIA URNS QL MICRO: ABNORMAL /HPF
BASOPHILS # BLD: 0 K/UL (ref 0–0.1)
BASOPHILS NFR BLD: 0 % (ref 0–1)
BILIRUB SERPL-MCNC: 1.3 MG/DL (ref 0.2–1)
BILIRUB SERPL-MCNC: 1.8 MG/DL (ref 0.2–1)
BILIRUB UR QL CFM: NEGATIVE
BUN SERPL-MCNC: 49 MG/DL (ref 6–20)
BUN SERPL-MCNC: 50 MG/DL (ref 6–20)
BUN/CREAT SERPL: 23 (ref 12–20)
BUN/CREAT SERPL: 24 (ref 12–20)
CALCIUM SERPL-MCNC: 7.8 MG/DL (ref 8.5–10.1)
CALCIUM SERPL-MCNC: 8.9 MG/DL (ref 8.5–10.1)
CHLORIDE SERPL-SCNC: 90 MMOL/L (ref 97–108)
CHLORIDE SERPL-SCNC: 94 MMOL/L (ref 97–108)
CO2 SERPL-SCNC: 22 MMOL/L (ref 21–32)
CO2 SERPL-SCNC: 22 MMOL/L (ref 21–32)
COLOR UR: ABNORMAL
CREAT SERPL-MCNC: 2.04 MG/DL (ref 0.55–1.02)
CREAT SERPL-MCNC: 2.17 MG/DL (ref 0.55–1.02)
DIFFERENTIAL METHOD BLD: ABNORMAL
EOSINOPHIL # BLD: 0.1 K/UL (ref 0–0.4)
EOSINOPHIL NFR BLD: 1 % (ref 0–7)
EPITH CASTS URNS QL MICRO: ABNORMAL /LPF
ERYTHROCYTE [DISTWIDTH] IN BLOOD BY AUTOMATED COUNT: 14.6 % (ref 11.5–14.5)
GLOBULIN SER CALC-MCNC: 4.3 G/DL (ref 2–4)
GLOBULIN SER CALC-MCNC: 5.5 G/DL (ref 2–4)
GLUCOSE BLD STRIP.AUTO-MCNC: 302 MG/DL (ref 65–100)
GLUCOSE SERPL-MCNC: 244 MG/DL (ref 65–100)
GLUCOSE SERPL-MCNC: 248 MG/DL (ref 65–100)
GLUCOSE UR STRIP.AUTO-MCNC: 100 MG/DL
GRAN CASTS URNS QL MICRO: ABNORMAL /LPF
HCT VFR BLD AUTO: 31.7 % (ref 35–47)
HGB BLD-MCNC: 10.8 G/DL (ref 11.5–16)
HGB UR QL STRIP: ABNORMAL
IMM GRANULOCYTES # BLD AUTO: 0.1 K/UL (ref 0–0.04)
IMM GRANULOCYTES NFR BLD AUTO: 1 % (ref 0–0.5)
KETONES UR QL STRIP.AUTO: ABNORMAL MG/DL
LEUKOCYTE ESTERASE UR QL STRIP.AUTO: ABNORMAL
LYMPHOCYTES # BLD: 0.4 K/UL (ref 0.8–3.5)
LYMPHOCYTES NFR BLD: 3 % (ref 12–49)
MAGNESIUM SERPL-MCNC: 2 MG/DL (ref 1.6–2.4)
MCH RBC QN AUTO: 31.2 PG (ref 26–34)
MCHC RBC AUTO-ENTMCNC: 34.1 G/DL (ref 30–36.5)
MCV RBC AUTO: 91.6 FL (ref 80–99)
MONOCYTES # BLD: 1.8 K/UL (ref 0–1)
MONOCYTES NFR BLD: 12 % (ref 5–13)
NEUTS SEG # BLD: 12.5 K/UL (ref 1.8–8)
NEUTS SEG NFR BLD: 83 % (ref 32–75)
NITRITE UR QL STRIP.AUTO: NEGATIVE
NRBC # BLD: 0 K/UL (ref 0–0.01)
NRBC BLD-RTO: 0 PER 100 WBC
PH UR STRIP: 5.5 [PH] (ref 5–8)
PLATELET # BLD AUTO: 132 K/UL (ref 150–400)
PMV BLD AUTO: 12.6 FL (ref 8.9–12.9)
POTASSIUM SERPL-SCNC: 2.6 MMOL/L (ref 3.5–5.1)
POTASSIUM SERPL-SCNC: 2.8 MMOL/L (ref 3.5–5.1)
PROT SERPL-MCNC: 6.6 G/DL (ref 6.4–8.2)
PROT SERPL-MCNC: 8.3 G/DL (ref 6.4–8.2)
PROT UR STRIP-MCNC: 100 MG/DL
RBC # BLD AUTO: 3.46 M/UL (ref 3.8–5.2)
RBC #/AREA URNS HPF: ABNORMAL /HPF (ref 0–5)
RBC MORPH BLD: ABNORMAL
SERVICE CMNT-IMP: ABNORMAL
SODIUM SERPL-SCNC: 125 MMOL/L (ref 136–145)
SODIUM SERPL-SCNC: 126 MMOL/L (ref 136–145)
SP GR UR REFRACTOMETRY: 1.01 (ref 1–1.03)
UA: UC IF INDICATED,UAUC: ABNORMAL
UROBILINOGEN UR QL STRIP.AUTO: 1 EU/DL (ref 0.2–1)
WBC # BLD AUTO: 14.9 K/UL (ref 3.6–11)
WBC URNS QL MICRO: ABNORMAL /HPF (ref 0–4)

## 2019-06-20 PROCEDURE — 36415 COLL VENOUS BLD VENIPUNCTURE: CPT

## 2019-06-20 PROCEDURE — 74011636637 HC RX REV CODE- 636/637: Performed by: INTERNAL MEDICINE

## 2019-06-20 PROCEDURE — 87086 URINE CULTURE/COLONY COUNT: CPT

## 2019-06-20 PROCEDURE — 96376 TX/PRO/DX INJ SAME DRUG ADON: CPT

## 2019-06-20 PROCEDURE — 83735 ASSAY OF MAGNESIUM: CPT

## 2019-06-20 PROCEDURE — 74011250637 HC RX REV CODE- 250/637: Performed by: PHYSICIAN ASSISTANT

## 2019-06-20 PROCEDURE — 96374 THER/PROPH/DIAG INJ IV PUSH: CPT

## 2019-06-20 PROCEDURE — 87077 CULTURE AEROBIC IDENTIFY: CPT

## 2019-06-20 PROCEDURE — 80053 COMPREHEN METABOLIC PANEL: CPT

## 2019-06-20 PROCEDURE — 74011000250 HC RX REV CODE- 250: Performed by: PHYSICIAN ASSISTANT

## 2019-06-20 PROCEDURE — 74011250636 HC RX REV CODE- 250/636: Performed by: PHYSICIAN ASSISTANT

## 2019-06-20 PROCEDURE — 74011250637 HC RX REV CODE- 250/637: Performed by: INTERNAL MEDICINE

## 2019-06-20 PROCEDURE — 65660000000 HC RM CCU STEPDOWN

## 2019-06-20 PROCEDURE — 74176 CT ABD & PELVIS W/O CONTRAST: CPT

## 2019-06-20 PROCEDURE — 74011250636 HC RX REV CODE- 250/636: Performed by: INTERNAL MEDICINE

## 2019-06-20 PROCEDURE — 81001 URINALYSIS AUTO W/SCOPE: CPT

## 2019-06-20 PROCEDURE — 87040 BLOOD CULTURE FOR BACTERIA: CPT

## 2019-06-20 PROCEDURE — 99285 EMERGENCY DEPT VISIT HI MDM: CPT

## 2019-06-20 PROCEDURE — 87186 SC STD MICRODIL/AGAR DIL: CPT

## 2019-06-20 PROCEDURE — 82962 GLUCOSE BLOOD TEST: CPT

## 2019-06-20 PROCEDURE — 96365 THER/PROPH/DIAG IV INF INIT: CPT

## 2019-06-20 PROCEDURE — 85025 COMPLETE CBC W/AUTO DIFF WBC: CPT

## 2019-06-20 PROCEDURE — 96372 THER/PROPH/DIAG INJ SC/IM: CPT

## 2019-06-20 PROCEDURE — 93005 ELECTROCARDIOGRAM TRACING: CPT

## 2019-06-20 PROCEDURE — 96361 HYDRATE IV INFUSION ADD-ON: CPT

## 2019-06-20 RX ORDER — KETOROLAC TROMETHAMINE 30 MG/ML
15 INJECTION, SOLUTION INTRAMUSCULAR; INTRAVENOUS
Status: COMPLETED | OUTPATIENT
Start: 2019-06-20 | End: 2019-06-20

## 2019-06-20 RX ORDER — POTASSIUM CHLORIDE 7.45 MG/ML
10 INJECTION INTRAVENOUS
Status: COMPLETED | OUTPATIENT
Start: 2019-06-20 | End: 2019-06-20

## 2019-06-20 RX ORDER — MORPHINE SULFATE 2 MG/ML
1 INJECTION, SOLUTION INTRAMUSCULAR; INTRAVENOUS
Status: DISCONTINUED | OUTPATIENT
Start: 2019-06-20 | End: 2019-06-23 | Stop reason: HOSPADM

## 2019-06-20 RX ORDER — ENOXAPARIN SODIUM 100 MG/ML
40 INJECTION SUBCUTANEOUS EVERY 24 HOURS
Status: DISCONTINUED | OUTPATIENT
Start: 2019-06-20 | End: 2019-06-23 | Stop reason: HOSPADM

## 2019-06-20 RX ORDER — ONDANSETRON 2 MG/ML
4 INJECTION INTRAMUSCULAR; INTRAVENOUS ONCE
Status: ACTIVE | OUTPATIENT
Start: 2019-06-20 | End: 2019-06-21

## 2019-06-20 RX ORDER — LIDOCAINE 4 G/100G
1 PATCH TOPICAL
Status: COMPLETED | OUTPATIENT
Start: 2019-06-20 | End: 2019-06-21

## 2019-06-20 RX ORDER — METHOTREXATE 2.5 MG/1
2.5 TABLET ORAL
COMMUNITY

## 2019-06-20 RX ORDER — ONDANSETRON 4 MG/1
8 TABLET, ORALLY DISINTEGRATING ORAL
Status: COMPLETED | OUTPATIENT
Start: 2019-06-20 | End: 2019-06-20

## 2019-06-20 RX ORDER — MAGNESIUM SULFATE 100 %
4 CRYSTALS MISCELLANEOUS AS NEEDED
Status: DISCONTINUED | OUTPATIENT
Start: 2019-06-20 | End: 2019-06-23 | Stop reason: HOSPADM

## 2019-06-20 RX ORDER — POTASSIUM CHLORIDE 7.45 MG/ML
10 INJECTION INTRAVENOUS
Status: COMPLETED | OUTPATIENT
Start: 2019-06-20 | End: 2019-06-21

## 2019-06-20 RX ORDER — TRAMADOL HYDROCHLORIDE 50 MG/1
50 TABLET ORAL
COMMUNITY

## 2019-06-20 RX ORDER — INSULIN LISPRO 100 [IU]/ML
INJECTION, SOLUTION INTRAVENOUS; SUBCUTANEOUS
Status: DISCONTINUED | OUTPATIENT
Start: 2019-06-20 | End: 2019-06-23 | Stop reason: HOSPADM

## 2019-06-20 RX ORDER — SODIUM CHLORIDE 0.9 % (FLUSH) 0.9 %
5-40 SYRINGE (ML) INJECTION AS NEEDED
Status: DISCONTINUED | OUTPATIENT
Start: 2019-06-20 | End: 2019-06-23 | Stop reason: HOSPADM

## 2019-06-20 RX ORDER — POTASSIUM CHLORIDE 1.5 G/1.77G
40 POWDER, FOR SOLUTION ORAL
Status: COMPLETED | OUTPATIENT
Start: 2019-06-20 | End: 2019-06-20

## 2019-06-20 RX ORDER — SODIUM CHLORIDE 9 MG/ML
100 INJECTION, SOLUTION INTRAVENOUS CONTINUOUS
Status: DISCONTINUED | OUTPATIENT
Start: 2019-06-20 | End: 2019-06-21

## 2019-06-20 RX ORDER — SODIUM CHLORIDE 0.9 % (FLUSH) 0.9 %
5-40 SYRINGE (ML) INJECTION EVERY 8 HOURS
Status: DISCONTINUED | OUTPATIENT
Start: 2019-06-20 | End: 2019-06-21

## 2019-06-20 RX ORDER — AMLODIPINE BESYLATE 10 MG/1
10 TABLET ORAL DAILY
COMMUNITY

## 2019-06-20 RX ORDER — DEXTROSE MONOHYDRATE 100 MG/ML
125-250 INJECTION, SOLUTION INTRAVENOUS AS NEEDED
Status: DISCONTINUED | OUTPATIENT
Start: 2019-06-20 | End: 2019-06-23 | Stop reason: HOSPADM

## 2019-06-20 RX ORDER — LABETALOL HYDROCHLORIDE 5 MG/ML
10 INJECTION, SOLUTION INTRAVENOUS
Status: DISCONTINUED | OUTPATIENT
Start: 2019-06-20 | End: 2019-06-23 | Stop reason: HOSPADM

## 2019-06-20 RX ORDER — POTASSIUM CHLORIDE 20 MEQ/1
40 TABLET, EXTENDED RELEASE ORAL ONCE
Status: ACTIVE | OUTPATIENT
Start: 2019-06-20 | End: 2019-06-21

## 2019-06-20 RX ORDER — CEPHALEXIN 500 MG/1
500 CAPSULE ORAL 3 TIMES DAILY
Qty: 30 CAP | Refills: 0 | Status: SHIPPED | OUTPATIENT
Start: 2019-06-20 | End: 2019-06-23 | Stop reason: SDUPTHER

## 2019-06-20 RX ORDER — ONDANSETRON 4 MG/1
4 TABLET, ORALLY DISINTEGRATING ORAL
Status: DISCONTINUED | OUTPATIENT
Start: 2019-06-20 | End: 2019-06-23 | Stop reason: HOSPADM

## 2019-06-20 RX ORDER — DICLOFENAC SODIUM 10 MG/G
GEL TOPICAL
COMMUNITY

## 2019-06-20 RX ORDER — FOLIC ACID 1 MG/1
1 TABLET ORAL DAILY
COMMUNITY

## 2019-06-20 RX ADMIN — KETOROLAC TROMETHAMINE 15 MG: 30 INJECTION, SOLUTION INTRAMUSCULAR; INTRAVENOUS at 11:40

## 2019-06-20 RX ADMIN — POTASSIUM CHLORIDE 10 MEQ: 10 INJECTION, SOLUTION INTRAVENOUS at 22:24

## 2019-06-20 RX ADMIN — POTASSIUM CHLORIDE 10 MEQ: 10 INJECTION, SOLUTION INTRAVENOUS at 17:18

## 2019-06-20 RX ADMIN — POTASSIUM CHLORIDE 10 MEQ: 10 INJECTION, SOLUTION INTRAVENOUS at 20:48

## 2019-06-20 RX ADMIN — POTASSIUM CHLORIDE 40 MEQ: 1.5 POWDER, FOR SOLUTION ORAL at 13:44

## 2019-06-20 RX ADMIN — INSULIN LISPRO 4 UNITS: 100 INJECTION, SOLUTION INTRAVENOUS; SUBCUTANEOUS at 21:50

## 2019-06-20 RX ADMIN — ONDANSETRON 8 MG: 4 TABLET, ORALLY DISINTEGRATING ORAL at 13:12

## 2019-06-20 RX ADMIN — POTASSIUM CHLORIDE 10 MEQ: 10 INJECTION, SOLUTION INTRAVENOUS at 23:48

## 2019-06-20 RX ADMIN — ACETAMINOPHEN 650 MG: 160 SOLUTION ORAL at 20:45

## 2019-06-20 RX ADMIN — SODIUM CHLORIDE 1000 ML: 900 INJECTION, SOLUTION INTRAVENOUS at 16:48

## 2019-06-20 RX ADMIN — MORPHINE SULFATE 1 MG: 2 INJECTION, SOLUTION INTRAMUSCULAR; INTRAVENOUS at 21:51

## 2019-06-20 RX ADMIN — LIDOCAINE HYDROCHLORIDE 1 G: 10 INJECTION, SOLUTION EPIDURAL; INFILTRATION; INTRACAUDAL; PERINEURAL at 13:13

## 2019-06-20 RX ADMIN — SODIUM CHLORIDE 1000 ML: 900 INJECTION, SOLUTION INTRAVENOUS at 13:40

## 2019-06-20 RX ADMIN — KETOROLAC TROMETHAMINE 15 MG: 30 INJECTION, SOLUTION INTRAMUSCULAR at 16:42

## 2019-06-20 RX ADMIN — ENOXAPARIN SODIUM 40 MG: 40 INJECTION, SOLUTION INTRAVENOUS; SUBCUTANEOUS at 20:45

## 2019-06-20 RX ADMIN — Medication 10 ML: at 21:51

## 2019-06-20 NOTE — ED NOTES
Patient spilled water on her bed. Patient needed to go to the bathroom so she was unhooked and ambulated to the bathroom without assistance. Linens were changed while she was gone. When patient returned I assisted her in changing her gown, hooked her back up to her fluids and got her repositioned in the bed. Pharmacy tech at the bedside verifying home medication list at this time.

## 2019-06-20 NOTE — H&P
Hospitalist Admission Note    NAME: Joslyn James   :  1965   MRN:  [id number]     Date/Time:  2019 6:19 PM    Patient PCP: Marizol Ayala MD  ______________________________________________________________________  Given the patient's current clinical presentation, I have a high level of concern for decompensation if discharged from the emergency department. Complex decision making was performed, which includes reviewing the patient's available past medical records, laboratory results, and x-ray films. My assessment of this patient's clinical condition and my plan of care is as follows. Assessment / Plan:  B/L Pyelonephritis B/L as per CT POA  UTI POA  -Continue Rocephin  -Urine Culture  -Blood Culture  ? / Simponi , taking for RA     Nausea/Vomting POA  REBECCA POA  Hypokalemia  Hyponatremia    -Zofran PRN  -Replace K  Check Serum Osmality,   IVF  -Avoid nephrotixic meds  Check Na later today    RA  Started on Simponi dec 2018  Was taking Ramicade in past.      DMII   SSI for now    HLD, HTN  -Continue Meds. Code Status: Full Code  Surrogate Decision Maker: Kit Pacheco     DVT Prophylaxis: Lovenox  GI Prophylaxis: not indicated          Subjective:   CHIEF COMPLAINT: Nausea, vomiting    HISTORY OF PRESENT ILLNESS:     Joslyn James, 52 y.o. female with PMHx  for morbid obesity, hypertension, rheumatoid arthritis,, presents to the ED with cc of abdominal pain since Friday with intermittent nausea and vomiting as well as increased urinary urgency and frequency. 6 days ago, she started having fever chills and urinary frequency and 2 days ago, Nausea and vomiting. No prior history of UTI     Patient denies any vaginal discharge, chest pain, shortness of breath, headache, rash, diarrhea, sweating or weight loss.           We were asked to admit for work up and evaluation of the above problems.      Past Medical History:   Diagnosis Date    Arthritis RA    Autoimmune disease ([facility] [address]     RA    Diabetes ([facility] [address]     Hypertension     CONTOLLED BY MEDS    Other ill-defined conditions(799.89)     abscesses under the right arm        Past Surgical History:   Procedure Laterality Date    ABDOMEN SURGERY PROC UNLISTED      Lap band    HX GASTRIC BYPASS      GASTRIC BAND.  HX GYN      BTL       Social History     Tobacco Use    Smoking status: Never Smoker    Smokeless tobacco: Never Used   Substance Use Topics    Alcohol use: Yes     Alcohol/week: 0.5 oz     Types: 1 Cans of beer per week     Comment: occ        Family History   Problem Relation Age of Onset    Diabetes Mother     Cancer Father      Allergies   Allergen Reactions    Ibuprofen Other (comments)     interferes with other medication per patient          Prior to Admission medications    Medication Sig Start Date End Date Taking? Authorizing Provider   cephALEXin (KEFLEX) 500 mg capsule Take 1 Cap by mouth three (3) times daily for 10 days. 6/20/19 6/30/19 Yes Johana Ventura PA   methocarbamol (ROBAXIN) 500 mg tablet Take 1 Tab by mouth every eight (8) hours as needed for Pain. 4/10/19   Manuel Gallego PA-C   atorvastatin (LIPITOR) 40 mg tablet Take 40 mg by mouth daily (with breakfast). Provider, Historical   SITagliptin-metFORMIN (JANUMET)  mg per tablet Take 1 Tab by mouth two (2) times daily (with meals). BREAKFAST & LUNCH    Lillian Morton MD   losartan-hydroCHLOROthiazide (HYZAAR) 100-25 mg per tablet Take 1 Tab by mouth daily. Lillian Morton MD   glipiZIDE (GLUCOTROL) 10 mg tablet Take 5 mg by mouth two (2) times a day.  TAKES IN AM, & AT LUNCH TIME-1-2 PM    Lillian Morton MD   amLODIPine (NORVASC) 5 mg tablet TAKE ONE TABLET BY MOUTH ONCE DAILY  Patient taking differently: TAKE ONE TABLET BY MOUTH ONCE DAILY IN AM 5/14/14   Kit COOPER NP   atenolol (TENORMIN) 100 mg tablet TAKE ONE TABLET BY MOUTH ONCE DAILY ** NEED TO MAKE APPT** 5/7/14   Timothy Cordoba NP   acetaminophen (TYLENOL EXTRA STRENGTH) 500 mg tablet Take 1,000 mg by mouth daily. Selene, MD Lillian       REVIEW OF SYSTEMS:     I am not able to complete the review of systems because: The patient is intubated and sedated    The patient has altered mental status due to his acute medical problems    The patient has baseline aphasia from prior stroke(s)    The patient has baseline dementia and is not reliable historian    The patient is in acute medical distress and unable to provide information           Total of 12 systems reviewed as follows:       POSITIVE= underlined text  Negative = text not underlined  General:  fever, chills, sweats, generalized weakness, weight loss/gain,      loss of appetite   Eyes:    blurred vision, eye pain, loss of vision, double vision  ENT:    rhinorrhea, pharyngitis   Respiratory:   cough, sputum production, SOB, SHULTZ, wheezing, pleuritic pain   Cardiology:   chest pain, palpitations, orthopnea, PND, edema, syncope   Gastrointestinal:  abdominal pain , N/V, diarrhea, dysphagia, constipation, bleeding   Genitourinary:  frequency, urgency, dysuria, hematuria, incontinence   Muskuloskeletal :  arthralgia, myalgia, back pain  Hematology:  easy bruising, nose or gum bleeding, lymphadenopathy   Dermatological: rash, ulceration, pruritis, color change / jaundice  Endocrine:   hot flashes or polydipsia   Neurological:  headache, dizziness, confusion, focal weakness, paresthesia,     Speech difficulties, memory loss, gait difficulty  Psychological: Feelings of anxiety, depression, agitation    Objective:   VITALS:    Visit Vitals  /67   Pulse 78   Temp 97.4 °F (36.3 °C)   Resp 18   Ht 5' 9\" (1.753 m)   Wt 124.7 kg (275 lb)   SpO2 91%   BMI 40.61 kg/m²       PHYSICAL EXAM:    General:    Alert, cooperative, no distress, appears stated age.      HEENT: Atraumatic, anicteric sclerae, pink conjunctivae     No oral ulcers, mucosa moist, throat clear, dentition fair  Neck:  Supple, symmetrical, thyroid: non tender  Lungs:   Clear to auscultation bilaterally. No Wheezing or Rhonchi. No rales. Chest wall:  No tenderness  No Accessory muscle use. Heart:   Regular  rhythm,  No  murmur   No edema  Abdomen:   Soft, non-tender. Not distended. Bowel sounds normal , No CVA tenderness  Extremities: No cyanosis. No clubbing,    Skin:     Not pale. Not Jaundiced  No rashes   Psych:  Good insight. Not depressed. Not anxious or agitated. Neurologic: EOMs intact. No facial asymmetry. No aphasia or slurred speech. Symmetrical strength, Sensation grossly intact. Alert and oriented X 4.     _______________________________________________________________________  Care Plan discussed with:    Comments   Patient x    Family  x    RN     Care Manager                    Consultant:      _______________________________________________________________________  Expected  Disposition:   Home with Family x   HH/PT/OT/RN    SNF/LTC    YANIV    ________________________________________________________________________  TOTAL TIME:  48 Minutes    Critical Care Provided     Minutes non procedure based      Comments     Reviewed previous records   >50% of visit spent in counseling and coordination of care  Discussion with patient and/or family and questions answered       ________________________________________________________________________  Signed: Cathy Farfan MD    Procedures: see electronic medical records for all procedures/Xrays and details which were not copied into this note but were reviewed prior to creation of Plan.     LAB DATA REVIEWED:    Recent Results (from the past 24 hour(s))   URINALYSIS W/ REFLEX CULTURE    Collection Time: 06/20/19 11:28 AM   Result Value Ref Range    Color YELLOW/STRAW      Appearance TURBID (A) CLEAR      Specific gravity 1.014 1.003 - 1.030      pH (UA) 5.5 5.0 - 8.0      Protein 100 (A) NEG mg/dL    Glucose 100 (A) NEG mg/dL    Ketone TRACE (A) NEG mg/dL    Blood SMALL (A) NEG Urobilinogen 1.0 0.2 - 1.0 EU/dL    Nitrites NEGATIVE  NEG      Leukocyte Esterase LARGE (A) NEG      WBC 20-50 0 - 4 /hpf    RBC 0-5 0 - 5 /hpf    Epithelial cells MANY (A) FEW /lpf    Bacteria 3+ (A) NEG /hpf    UA:UC IF INDICATED URINE CULTURE ORDERED (A) CNI      Amorphous Crystals 1+ (A) NEG    Granular cast 2-5 (A) NEG /lpf   BILIRUBIN, CONFIRM    Collection Time: 06/20/19 11:28 AM   Result Value Ref Range    Bilirubin UA, confirm NEGATIVE  NEG     CBC WITH AUTOMATED DIFF    Collection Time: 06/20/19 12:02 PM   Result Value Ref Range    WBC 14.9 (H) 3.6 - 11.0 K/uL    RBC 3.46 (L) 3.80 - 5.20 M/uL    HGB 10.8 (L) 11.5 - 16.0 g/dL    HCT 31.7 (L) 35.0 - 47.0 %    MCV 91.6 80.0 - 99.0 FL    MCH 31.2 26.0 - 34.0 PG    MCHC 34.1 30.0 - 36.5 g/dL    RDW 14.6 (H) 11.5 - 14.5 %    PLATELET 316 (L) 974 - 400 K/uL    MPV 12.6 8.9 - 12.9 FL    NRBC 0.0 0  WBC    ABSOLUTE NRBC 0.00 0.00 - 0.01 K/uL    NEUTROPHILS 83 (H) 32 - 75 %    LYMPHOCYTES 3 (L) 12 - 49 %    MONOCYTES 12 5 - 13 %    EOSINOPHILS 1 0 - 7 %    BASOPHILS 0 0 - 1 %    IMMATURE GRANULOCYTES 1 (H) 0.0 - 0.5 %    ABS. NEUTROPHILS 12.5 (H) 1.8 - 8.0 K/UL    ABS. LYMPHOCYTES 0.4 (L) 0.8 - 3.5 K/UL    ABS. MONOCYTES 1.8 (H) 0.0 - 1.0 K/UL    ABS. EOSINOPHILS 0.1 0.0 - 0.4 K/UL    ABS. BASOPHILS 0.0 0.0 - 0.1 K/UL    ABS. IMM.  GRANS. 0.1 (H) 0.00 - 0.04 K/UL    DF AUTOMATED      RBC COMMENTS NORMOCYTIC, NORMOCHROMIC     METABOLIC PANEL, COMPREHENSIVE    Collection Time: 06/20/19 12:02 PM   Result Value Ref Range    Sodium 125 (L) 136 - 145 mmol/L    Potassium 2.8 (L) 3.5 - 5.1 mmol/L    Chloride 90 (L) 97 - 108 mmol/L    CO2 22 21 - 32 mmol/L    Anion gap 13 5 - 15 mmol/L    Glucose 248 (H) 65 - 100 mg/dL    BUN 50 (H) 6 - 20 MG/DL    Creatinine 2.17 (H) 0.55 - 1.02 MG/DL    BUN/Creatinine ratio 23 (H) 12 - 20      GFR est AA 29 (L) >60 ml/min/1.73m2    GFR est non-AA 24 (L) >60 ml/min/1.73m2    Calcium 8.9 8.5 - 10.1 MG/DL    Bilirubin, total 1.8 (H) 0.2 - 1.0 MG/DL    ALT (SGPT) 20 12 - 78 U/L    AST (SGOT) 26 15 - 37 U/L    Alk. phosphatase 98 45 - 117 U/L    Protein, total 8.3 (H) 6.4 - 8.2 g/dL    Albumin 2.8 (L) 3.5 - 5.0 g/dL    Globulin 5.5 (H) 2.0 - 4.0 g/dL    A-G Ratio 0.5 (L) 1.1 - 2.2     EKG, 12 LEAD, INITIAL    Collection Time: 06/20/19  2:00 PM   Result Value Ref Range    Ventricular Rate 73 BPM    Atrial Rate 73 BPM    P-R Interval 172 ms    QRS Duration 100 ms    Q-T Interval 420 ms    QTC Calculation (Bezet) 462 ms    Calculated P Axis 56 degrees    Calculated R Axis 1 degrees    Calculated T Axis 64 degrees    Diagnosis       Normal sinus rhythm  Nonspecific T wave abnormality  Prolonged QT  When compared with ECG of 21-JUN-2018 11:59,  No significant change was found     METABOLIC PANEL, COMPREHENSIVE    Collection Time: 06/20/19  3:31 PM   Result Value Ref Range    Sodium 126 (L) 136 - 145 mmol/L    Potassium 2.6 (LL) 3.5 - 5.1 mmol/L    Chloride 94 (L) 97 - 108 mmol/L    CO2 22 21 - 32 mmol/L    Anion gap 10 5 - 15 mmol/L    Glucose 244 (H) 65 - 100 mg/dL    BUN 49 (H) 6 - 20 MG/DL    Creatinine 2.04 (H) 0.55 - 1.02 MG/DL    BUN/Creatinine ratio 24 (H) 12 - 20      GFR est AA 31 (L) >60 ml/min/1.73m2    GFR est non-AA 25 (L) >60 ml/min/1.73m2    Calcium 7.8 (L) 8.5 - 10.1 MG/DL    Bilirubin, total 1.3 (H) 0.2 - 1.0 MG/DL    ALT (SGPT) 15 12 - 78 U/L    AST (SGOT) 21 15 - 37 U/L    Alk.  phosphatase 80 45 - 117 U/L    Protein, total 6.6 6.4 - 8.2 g/dL    Albumin 2.3 (L) 3.5 - 5.0 g/dL    Globulin 4.3 (H) 2.0 - 4.0 g/dL    A-G Ratio 0.5 (L) 1.1 - 2.2     MAGNESIUM    Collection Time: 06/20/19  3:31 PM   Result Value Ref Range    Magnesium 2.0 1.6 - 2.4 mg/dL

## 2019-06-20 NOTE — PROGRESS NOTES
TRANSFER - IN REPORT:    Verbal report received from BOO Phipps(name) on Joslyn James  being received from ED (unit) for routine progression of care      Report consisted of patients Situation, Background, Assessment and   Recommendations(SBAR). Information from the following report(s) SBAR, Kardex, Procedure Summary, Intake/Output, MAR and Recent Results was reviewed with the receiving nurse. Opportunity for questions and clarification was provided. Assessment completed upon patients arrival to unit and care assumed.

## 2019-06-20 NOTE — PROGRESS NOTES
Pharmacy Clarification of the Prior to Admission Medication Regimen Retrospective to the Admission Medication Reconciliation    The patient was interviewed regarding clarification of the prior to admission medication regimen. Patient's daughter was present in room and obtained permission from patient to discuss drug regimen with visitor(s) present. Patient was questioned regarding use of any other inhalers, topical products, over the counter medications, herbal medications, vitamin products or ophthalmic/nasal/otic medication use. Information Obtained From: RX Query, patient, prescription bottles    Recommendations/Findings: The following amendments were made to the patient's active medication list on file at [facility]:     1) Additions:   methotrexate (RHEUMATREX) 2.5 mg tablet  traMADol (ULTRAM) 50 mg tablet  folic acid (FOLVITE) 1 mg tablet  diclofenac (VOLTAREN) 1 % gel    2) Removals:   Methocarbamol    3) Changes:  amLODIPine (NORVASC) tablet (Old regimen: (strength 5 mg) 1 tab daily /New regimen: (strength 10 mg) 1 tab daily)    4) Pertinent Pharmacy Findings:  Per patient she receives an  Simponi infusion for rheumatoid arthritis at [facility] with Dr. Shi Maxwell??. Patient could not remember last date of last infusion. PTA medication list was corrected to the following:     Prior to Admission Medications   Prescriptions Last Dose Informant Patient Reported? Taking? SITagliptin-metFORMIN (JANUMET)  mg per tablet 6/19/2019 at Unknown time Self Yes Yes   Sig: Take 1 Tab by mouth two (2) times daily (with meals). BREAKFAST & LUNCH   acetaminophen (TYLENOL EXTRA STRENGTH) 500 mg tablet 6/19/2019 at Unknown time Self Yes Yes   Sig: Take 1,000 mg by mouth daily. amLODIPine (NORVASC) 10 mg tablet 6/19/2019 at Unknown time Self Yes Yes   Sig: Take 10 mg by mouth daily.    atenolol (TENORMIN) 100 mg tablet 6/19/2019 at Unknown time Self No Yes   Sig: TAKE ONE TABLET BY MOUTH ONCE DAILY ** NEED TO MAKE APPT**   atorvastatin (LIPITOR) 40 mg tablet 6/19/2019 at Unknown time Self Yes Yes   Sig: Take 40 mg by mouth daily (with breakfast). diclofenac (VOLTAREN) 1 % gel 6/13/2019 at Unknown time Self Yes Yes   Sig: Apply  to affected area four (4) times daily as needed for Pain. Apply to knees and shoulder   folic acid (FOLVITE) 1 mg tablet 6/19/2019 at Unknown time Self Yes Yes   Sig: Take 1 mg by mouth daily. glipiZIDE (GLUCOTROL) 10 mg tablet 6/19/2019 at Unknown time Self Yes Yes   Sig: Take 5 mg by mouth two (2) times a day. TAKES IN AM, & AT LUNCH TIME-1-2 PM   losartan-hydroCHLOROthiazide (HYZAAR) 100-25 mg per tablet 6/19/2019 at Unknown time Self Yes Yes   Sig: Take 1 Tab by mouth daily. methotrexate (RHEUMATREX) 2.5 mg tablet 6/19/2019 at Unknown time Self Yes Yes   Sig: Take 2.5 mg by mouth two (2) days a week. traMADol (ULTRAM) 50 mg tablet 6/19/2019 at Unknown time Self Yes Yes   Sig: Take 50 mg by mouth every eight (8) hours as needed for Pain.       Facility-Administered Medications: None          Thank you,  Abiodun Wilkinson  Medication History Pharmacy Technician

## 2019-06-20 NOTE — ED PROVIDER NOTES
EMERGENCY DEPARTMENT HISTORY AND PHYSICAL EXAM      Date: 6/20/2019  Patient Name: Julio Odonnell    History of Presenting Illness     Please note that this dictation was completed with GrowYo, the computer voice recognition software. Quite often unanticipated grammatical, syntax, homophones, and other interpretive errors are inadvertently transcribed by the computer software. Please disregard these errors. Please excuse any errors that have escaped final proofreading. Chief Complaint   Patient presents with    Abdominal Pain     Generalized n/v. Reports decreased appetite. Denies previous GI Hx. History Provided By: Patient    HPI: Julio Odonnell, 52 y.o. female with PMHx  for morbid obesity, hypertension, rheumatoid arthritis,, presents to the ED with cc of abdominal pain since Friday with intermittent nausea and vomiting as well as increased urinary urgency and frequency. Patient has not taking over-the-counter medication for symptoms. Patient denies any vaginal discharge, fevers, chills, chest pain, shortness of breath, headache, rash, diarrhea, sweating or weight loss. Patient states her last bowel movement was 3 days ago. There are no other complaints, changes, or physical findings at this time. Social History     Tobacco Use    Smoking status: Never Smoker    Smokeless tobacco: Never Used   Substance Use Topics    Alcohol use: Yes     Alcohol/week: 0.5 oz     Types: 1 Cans of beer per week     Comment: occ    Drug use: No       Allergies   Allergen Reactions    Ibuprofen Other (comments)     interferes with other medication per patient           PCP: ABUNDIO Iglesias MD    No current facility-administered medications on file prior to encounter. Current Outpatient Medications on File Prior to Encounter   Medication Sig Dispense Refill    amLODIPine (NORVASC) 10 mg tablet Take 10 mg by mouth daily.       methotrexate (RHEUMATREX) 2.5 mg tablet Take 2.5 mg by mouth two (2) days a week.  traMADol (ULTRAM) 50 mg tablet Take 50 mg by mouth every eight (8) hours as needed for Pain.  folic acid (FOLVITE) 1 mg tablet Take 1 mg by mouth daily.  diclofenac (VOLTAREN) 1 % gel Apply  to affected area four (4) times daily as needed for Pain. Apply to knees and shoulder      atorvastatin (LIPITOR) 40 mg tablet Take 40 mg by mouth daily (with breakfast).  SITagliptin-metFORMIN (JANUMET)  mg per tablet Take 1 Tab by mouth two (2) times daily (with meals). BREAKFAST & LUNCH      losartan-hydroCHLOROthiazide (HYZAAR) 100-25 mg per tablet Take 1 Tab by mouth daily.  glipiZIDE (GLUCOTROL) 10 mg tablet Take 5 mg by mouth two (2) times a day. TAKES IN AM, & AT LUNCH TIME-1-2 PM      atenolol (TENORMIN) 100 mg tablet TAKE ONE TABLET BY MOUTH ONCE DAILY ** NEED TO MAKE APPT** 30 Tab 0    acetaminophen (TYLENOL EXTRA STRENGTH) 500 mg tablet Take 1,000 mg by mouth daily. Past History     Past Medical History:  Past Medical History:   Diagnosis Date    Arthritis     RA    Autoimmune disease ([facility] [address]     RA    Diabetes ([facility] [address]     Hypertension     CONTOLLED BY MEDS    Other ill-defined conditions(799.89)     abscesses under the right arm       Past Surgical History:  Past Surgical History:   Procedure Laterality Date    ABDOMEN SURGERY PROC UNLISTED      Lap band    HX GASTRIC BYPASS      GASTRIC BAND.  HX GYN      BTL       Family History:  Family History   Problem Relation Age of Onset    Diabetes Mother     Cancer Father        Social History:  Social History     Tobacco Use    Smoking status: Never Smoker    Smokeless tobacco: Never Used   Substance Use Topics    Alcohol use: Yes     Alcohol/week: 0.5 oz     Types: 1 Cans of beer per week     Comment: occ    Drug use: No       Allergies:   Allergies   Allergen Reactions    Ibuprofen Other (comments)     interferes with other medication per patient           Review of Systems   Review of Systems Constitutional: Negative. Negative for chills and fever. HENT: Negative. Eyes: Negative. Respiratory: Negative. Negative for shortness of breath. Cardiovascular: Negative. Negative for chest pain. Gastrointestinal: Positive for constipation, nausea and vomiting. Negative for abdominal pain and diarrhea. Endocrine: Negative. Genitourinary: Positive for frequency and urgency. Negative for dysuria, flank pain, vaginal discharge and vaginal pain. Musculoskeletal: Negative. Skin: Negative. Allergic/Immunologic: Negative. Neurological: Negative. Negative for headaches. Hematological: Negative. Psychiatric/Behavioral: Negative. All other systems reviewed and are negative. Physical Exam   Physical Exam   Constitutional: She is oriented to person, place, and time. She appears well-developed and well-nourished. No distress. Morbidly obese   HENT:   Head: Normocephalic and atraumatic. Right Ear: External ear normal.   Left Ear: External ear normal.   Nose: Nose normal.   Mouth/Throat: Oropharynx is clear and moist.   Eyes: Pupils are equal, round, and reactive to light. Conjunctivae and EOM are normal.   Neck: Normal range of motion. Neck supple. No tracheal deviation present. Cardiovascular: Normal rate, regular rhythm, normal heart sounds and intact distal pulses. Pulmonary/Chest: Effort normal and breath sounds normal. No respiratory distress. She has no wheezes. Abdominal: Soft. Normal appearance and bowel sounds are normal. She exhibits no distension. There is no tenderness. There is CVA tenderness (mild cva tenderness bilat). There is no rebound, no tenderness at McBurney's point and negative Pugh's sign. Musculoskeletal: Normal range of motion. She exhibits no edema, tenderness or deformity. Lymphadenopathy:     She has no cervical adenopathy. Neurological: She is alert and oriented to person, place, and time. She has normal reflexes.  She displays normal reflexes. No cranial nerve deficit. She exhibits normal muscle tone. Coordination normal.   Skin: Skin is warm and dry. She is not diaphoretic. No pallor. Psychiatric: She has a normal mood and affect. Her behavior is normal. Judgment and thought content normal.   Nursing note and vitals reviewed. Diagnostic Study Results     Labs -     Recent Results (from the past 12 hour(s))   URINALYSIS W/ REFLEX CULTURE    Collection Time: 06/20/19 11:28 AM   Result Value Ref Range    Color YELLOW/STRAW      Appearance TURBID (A) CLEAR      Specific gravity 1.014 1.003 - 1.030      pH (UA) 5.5 5.0 - 8.0      Protein 100 (A) NEG mg/dL    Glucose 100 (A) NEG mg/dL    Ketone TRACE (A) NEG mg/dL    Blood SMALL (A) NEG      Urobilinogen 1.0 0.2 - 1.0 EU/dL    Nitrites NEGATIVE  NEG      Leukocyte Esterase LARGE (A) NEG      WBC 20-50 0 - 4 /hpf    RBC 0-5 0 - 5 /hpf    Epithelial cells MANY (A) FEW /lpf    Bacteria 3+ (A) NEG /hpf    UA:UC IF INDICATED URINE CULTURE ORDERED (A) CNI      Amorphous Crystals 1+ (A) NEG    Granular cast 2-5 (A) NEG /lpf   BILIRUBIN, CONFIRM    Collection Time: 06/20/19 11:28 AM   Result Value Ref Range    Bilirubin UA, confirm NEGATIVE  NEG     CBC WITH AUTOMATED DIFF    Collection Time: 06/20/19 12:02 PM   Result Value Ref Range    WBC 14.9 (H) 3.6 - 11.0 K/uL    RBC 3.46 (L) 3.80 - 5.20 M/uL    HGB 10.8 (L) 11.5 - 16.0 g/dL    HCT 31.7 (L) 35.0 - 47.0 %    MCV 91.6 80.0 - 99.0 FL    MCH 31.2 26.0 - 34.0 PG    MCHC 34.1 30.0 - 36.5 g/dL    RDW 14.6 (H) 11.5 - 14.5 %    PLATELET 032 (L) 254 - 400 K/uL    MPV 12.6 8.9 - 12.9 FL    NRBC 0.0 0  WBC    ABSOLUTE NRBC 0.00 0.00 - 0.01 K/uL    NEUTROPHILS 83 (H) 32 - 75 %    LYMPHOCYTES 3 (L) 12 - 49 %    MONOCYTES 12 5 - 13 %    EOSINOPHILS 1 0 - 7 %    BASOPHILS 0 0 - 1 %    IMMATURE GRANULOCYTES 1 (H) 0.0 - 0.5 %    ABS. NEUTROPHILS 12.5 (H) 1.8 - 8.0 K/UL    ABS. LYMPHOCYTES 0.4 (L) 0.8 - 3.5 K/UL    ABS.  MONOCYTES 1.8 (H) 0.0 - 1.0 K/UL ABS. EOSINOPHILS 0.1 0.0 - 0.4 K/UL    ABS. BASOPHILS 0.0 0.0 - 0.1 K/UL    ABS. IMM. GRANS. 0.1 (H) 0.00 - 0.04 K/UL    DF AUTOMATED      RBC COMMENTS NORMOCYTIC, NORMOCHROMIC     METABOLIC PANEL, COMPREHENSIVE    Collection Time: 06/20/19 12:02 PM   Result Value Ref Range    Sodium 125 (L) 136 - 145 mmol/L    Potassium 2.8 (L) 3.5 - 5.1 mmol/L    Chloride 90 (L) 97 - 108 mmol/L    CO2 22 21 - 32 mmol/L    Anion gap 13 5 - 15 mmol/L    Glucose 248 (H) 65 - 100 mg/dL    BUN 50 (H) 6 - 20 MG/DL    Creatinine 2.17 (H) 0.55 - 1.02 MG/DL    BUN/Creatinine ratio 23 (H) 12 - 20      GFR est AA 29 (L) >60 ml/min/1.73m2    GFR est non-AA 24 (L) >60 ml/min/1.73m2    Calcium 8.9 8.5 - 10.1 MG/DL    Bilirubin, total 1.8 (H) 0.2 - 1.0 MG/DL    ALT (SGPT) 20 12 - 78 U/L    AST (SGOT) 26 15 - 37 U/L    Alk.  phosphatase 98 45 - 117 U/L    Protein, total 8.3 (H) 6.4 - 8.2 g/dL    Albumin 2.8 (L) 3.5 - 5.0 g/dL    Globulin 5.5 (H) 2.0 - 4.0 g/dL    A-G Ratio 0.5 (L) 1.1 - 2.2     EKG, 12 LEAD, INITIAL    Collection Time: 06/20/19  2:00 PM   Result Value Ref Range    Ventricular Rate 73 BPM    Atrial Rate 73 BPM    P-R Interval 172 ms    QRS Duration 100 ms    Q-T Interval 420 ms    QTC Calculation (Bezet) 462 ms    Calculated P Axis 56 degrees    Calculated R Axis 1 degrees    Calculated T Axis 64 degrees    Diagnosis       Normal sinus rhythm  Nonspecific T wave abnormality  Prolonged QT  When compared with ECG of 21-JUN-2018 11:59,  No significant change was found     METABOLIC PANEL, COMPREHENSIVE    Collection Time: 06/20/19  3:31 PM   Result Value Ref Range    Sodium 126 (L) 136 - 145 mmol/L    Potassium 2.6 (LL) 3.5 - 5.1 mmol/L    Chloride 94 (L) 97 - 108 mmol/L    CO2 22 21 - 32 mmol/L    Anion gap 10 5 - 15 mmol/L    Glucose 244 (H) 65 - 100 mg/dL    BUN 49 (H) 6 - 20 MG/DL    Creatinine 2.04 (H) 0.55 - 1.02 MG/DL    BUN/Creatinine ratio 24 (H) 12 - 20      GFR est AA 31 (L) >60 ml/min/1.73m2    GFR est non-AA 25 (L) >60 ml/min/1.73m2    Calcium 7.8 (L) 8.5 - 10.1 MG/DL    Bilirubin, total 1.3 (H) 0.2 - 1.0 MG/DL    ALT (SGPT) 15 12 - 78 U/L    AST (SGOT) 21 15 - 37 U/L    Alk. phosphatase 80 45 - 117 U/L    Protein, total 6.6 6.4 - 8.2 g/dL    Albumin 2.3 (L) 3.5 - 5.0 g/dL    Globulin 4.3 (H) 2.0 - 4.0 g/dL    A-G Ratio 0.5 (L) 1.1 - 2.2     MAGNESIUM    Collection Time: 06/20/19  3:31 PM   Result Value Ref Range    Magnesium 2.0 1.6 - 2.4 mg/dL       Radiologic Studies -   CT ABD PELV WO CONT   Final Result   IMPRESSION:      1. Perinephric stranding of fat bilaterally, of uncertain significance, with no   hydroureteronephrosis or urolithiasis. No parenchymal mass or infection without   intravenous contrast. Correlate with clinical history, physical examination and   urinalysis. 2.  Other incidental and postoperative changes. CT Results  (Last 48 hours)               06/20/19 1212  CT ABD PELV WO CONT Final result    Impression:  IMPRESSION:       1. Perinephric stranding of fat bilaterally, of uncertain significance, with no   hydroureteronephrosis or urolithiasis. No parenchymal mass or infection without   intravenous contrast. Correlate with clinical history, physical examination and   urinalysis. 2.  Other incidental and postoperative changes. Narrative:  EXAM: CT ABD PELV WO CONT       INDICATION: concern for kidney stone , hematuria. COMPARISON: None. CONTRAST:  None. TECHNIQUE:    Thin axial images were obtained through the abdomen and pelvis. Coronal and   sagittal reconstructions were generated. Oral contrast was not administered. CT   dose reduction was achieved through use of a standardized protocol tailored for   this examination and automatic exposure control for dose modulation. The absence of intravenous contrast material reduces the sensitivity for   evaluation of the solid parenchymal organs of the abdomen.         FINDINGS:    LUNG BASES: Minimal bibasilar atelectasis or scar.   INCIDENTALLY IMAGED HEART AND MEDIASTINUM: Unremarkable. LIVER: No mass or biliary dilatation. GALLBLADDER: Unremarkable. SPLEEN: No mass. PANCREAS: No mass or ductal dilatation. ADRENALS: Unremarkable. KIDNEYS/URETERS: No mass, calculus, or hydronephrosis. However, there is   stranding of perinephric fat bilaterally and symmetrically. STOMACH: Gastric band incidentally noted around the gastric fundus. Otherwise   unremarkable. SMALL BOWEL: No dilatation or wall thickening. COLON: No dilatation or wall thickening. APPENDIX: Unremarkable. PERITONEUM: No ascites or pneumoperitoneum. RETROPERITONEUM: No lymphadenopathy or aortic aneurysm. REPRODUCTIVE ORGANS: Not well seen but unremarkable for age. URINARY BLADDER: Urinary bladder is not well distended and image quality is   degraded by left hip artifact. No obvious abnormality is confirmed. BONES: No destructive bone lesion. Left hip arthroplasty creating significant   artifact at the level of the pelvis. Patchy osteopenia. Multilevel degenerative   change. ADDITIONAL COMMENTS: N/A               CXR Results  (Last 48 hours)    None            Medical Decision Making   I am the first provider for this patient. I reviewed the vital signs, available nursing notes, past medical history, past surgical history, family history and social history. Vital Signs-Reviewed the patient's vital signs. Patient Vitals for the past 12 hrs:   Temp Pulse Resp BP SpO2   06/20/19 1759  78 18  91 %   06/20/19 1400    146/67 98 %   06/20/19 1300    128/49 95 %   06/20/19 1014 97.4 °F (36.3 °C) 88 14 145/49 97 %         Records Reviewed: Nursing Notes, Old Medical Records, Previous Radiology Studies and Previous Laboratory Studies    Provider Notes (Medical Decision Making):   Patient presents with abdominal pain.   DDx: Gastroenteritis, SBO, appendicitis, colitis, IBD, diverticulitis, mesenteric ischemia, AAA or descending dissection, ACS, ureteral stone. Will get labs and CT Abdomen. CONSULT NOTE:   5:31 PM  ELAINE Schaefer spoke with Peterson Soto,   Specialty: Hospitalist  Discussed pt's hx, disposition, and available diagnostic and imaging results. Reviewed care plans. Consultant agrees with plans of admission          ED Course:   Initial assessment performed. The patients presenting problems have been discussed, and they are in agreement with the care plan formulated and outlined with them. I have encouraged them to ask questions as they arise throughout their visit. Disposition:  admit    Diagnosis     Clinical Impression:   1. Pyelonephritis    2. Hyponatremia    3. REBECCA (acute kidney injury) ([facility] [address]    4.  Hypokalemia

## 2019-06-20 NOTE — ED NOTES
Bedside and Verbal shift change report given to Higinio Dejesus (oncoming nurse) by Trevor Barron (offgoing nurse). Report included the following information SBAR, ED Summary, MAR and Recent Results.

## 2019-06-21 LAB
ALBUMIN SERPL-MCNC: 2.4 G/DL (ref 3.5–5)
ALBUMIN/GLOB SERPL: 0.5 {RATIO} (ref 1.1–2.2)
ALP SERPL-CCNC: 96 U/L (ref 45–117)
ALT SERPL-CCNC: 20 U/L (ref 12–78)
ANION GAP SERPL CALC-SCNC: 11 MMOL/L (ref 5–15)
AST SERPL-CCNC: 27 U/L (ref 15–37)
ATRIAL RATE: 73 BPM
BASOPHILS # BLD: 0 K/UL (ref 0–0.1)
BASOPHILS NFR BLD: 0 % (ref 0–1)
BILIRUB SERPL-MCNC: 1.2 MG/DL (ref 0.2–1)
BUN SERPL-MCNC: 54 MG/DL (ref 6–20)
BUN/CREAT SERPL: 23 (ref 12–20)
CALCIUM SERPL-MCNC: 8.4 MG/DL (ref 8.5–10.1)
CALCULATED P AXIS, ECG09: 56 DEGREES
CALCULATED R AXIS, ECG10: 1 DEGREES
CALCULATED T AXIS, ECG11: 64 DEGREES
CHLORIDE SERPL-SCNC: 96 MMOL/L (ref 97–108)
CO2 SERPL-SCNC: 23 MMOL/L (ref 21–32)
COMMENT, HOLDF: NORMAL
CREAT SERPL-MCNC: 2.31 MG/DL (ref 0.55–1.02)
DIAGNOSIS, 93000: NORMAL
DIFFERENTIAL METHOD BLD: ABNORMAL
EOSINOPHIL # BLD: 0.8 K/UL (ref 0–0.4)
EOSINOPHIL NFR BLD: 7 % (ref 0–7)
ERYTHROCYTE [DISTWIDTH] IN BLOOD BY AUTOMATED COUNT: 14.5 % (ref 11.5–14.5)
GLOBULIN SER CALC-MCNC: 4.7 G/DL (ref 2–4)
GLUCOSE BLD STRIP.AUTO-MCNC: 160 MG/DL (ref 65–100)
GLUCOSE BLD STRIP.AUTO-MCNC: 176 MG/DL (ref 65–100)
GLUCOSE BLD STRIP.AUTO-MCNC: 203 MG/DL (ref 65–100)
GLUCOSE BLD STRIP.AUTO-MCNC: 250 MG/DL (ref 65–100)
GLUCOSE SERPL-MCNC: 232 MG/DL (ref 65–100)
HCT VFR BLD AUTO: 27.4 % (ref 35–47)
HGB BLD-MCNC: 9.7 G/DL (ref 11.5–16)
IMM GRANULOCYTES # BLD AUTO: 0 K/UL (ref 0–0.04)
IMM GRANULOCYTES NFR BLD AUTO: 0 % (ref 0–0.5)
LYMPHOCYTES # BLD: 0.7 K/UL (ref 0.8–3.5)
LYMPHOCYTES NFR BLD: 6 % (ref 12–49)
MCH RBC QN AUTO: 32.4 PG (ref 26–34)
MCHC RBC AUTO-ENTMCNC: 35.4 G/DL (ref 30–36.5)
MCV RBC AUTO: 91.6 FL (ref 80–99)
MONOCYTES # BLD: 1 K/UL (ref 0–1)
MONOCYTES NFR BLD: 8 % (ref 5–13)
NEUTS SEG # BLD: 9.6 K/UL (ref 1.8–8)
NEUTS SEG NFR BLD: 79 % (ref 32–75)
NRBC # BLD: 0 K/UL (ref 0–0.01)
NRBC BLD-RTO: 0 PER 100 WBC
P-R INTERVAL, ECG05: 172 MS
PLATELET # BLD AUTO: 146 K/UL (ref 150–400)
PMV BLD AUTO: 12.6 FL (ref 8.9–12.9)
POTASSIUM SERPL-SCNC: 3.2 MMOL/L (ref 3.5–5.1)
PROT SERPL-MCNC: 7.1 G/DL (ref 6.4–8.2)
Q-T INTERVAL, ECG07: 420 MS
QRS DURATION, ECG06: 100 MS
QTC CALCULATION (BEZET), ECG08: 462 MS
RBC # BLD AUTO: 2.99 M/UL (ref 3.8–5.2)
RBC MORPH BLD: ABNORMAL
SAMPLES BEING HELD,HOLD: NORMAL
SERVICE CMNT-IMP: ABNORMAL
SODIUM SERPL-SCNC: 130 MMOL/L (ref 136–145)
SODIUM UR-SCNC: 9 MMOL/L
VENTRICULAR RATE, ECG03: 73 BPM
WBC # BLD AUTO: 12.1 K/UL (ref 3.6–11)

## 2019-06-21 PROCEDURE — 74011636637 HC RX REV CODE- 636/637: Performed by: INTERNAL MEDICINE

## 2019-06-21 PROCEDURE — 82962 GLUCOSE BLOOD TEST: CPT

## 2019-06-21 PROCEDURE — 74011250637 HC RX REV CODE- 250/637: Performed by: INTERNAL MEDICINE

## 2019-06-21 PROCEDURE — 74011000258 HC RX REV CODE- 258: Performed by: INTERNAL MEDICINE

## 2019-06-21 PROCEDURE — 74011250636 HC RX REV CODE- 250/636: Performed by: INTERNAL MEDICINE

## 2019-06-21 PROCEDURE — 77030011256 HC DRSG MEPILEX <16IN NO BORD MOLN -A

## 2019-06-21 PROCEDURE — 83935 ASSAY OF URINE OSMOLALITY: CPT

## 2019-06-21 PROCEDURE — 84300 ASSAY OF URINE SODIUM: CPT

## 2019-06-21 PROCEDURE — 83930 ASSAY OF BLOOD OSMOLALITY: CPT

## 2019-06-21 PROCEDURE — 65660000000 HC RM CCU STEPDOWN

## 2019-06-21 PROCEDURE — 85025 COMPLETE CBC W/AUTO DIFF WBC: CPT

## 2019-06-21 PROCEDURE — 36415 COLL VENOUS BLD VENIPUNCTURE: CPT

## 2019-06-21 PROCEDURE — 80053 COMPREHEN METABOLIC PANEL: CPT

## 2019-06-21 RX ORDER — POTASSIUM CHLORIDE 1.5 G/1.77G
40 POWDER, FOR SOLUTION ORAL
Status: COMPLETED | OUTPATIENT
Start: 2019-06-21 | End: 2019-06-21

## 2019-06-21 RX ORDER — ACETAMINOPHEN 500 MG
1000 TABLET ORAL DAILY
Status: DISCONTINUED | OUTPATIENT
Start: 2019-06-22 | End: 2019-06-23 | Stop reason: HOSPADM

## 2019-06-21 RX ORDER — INSULIN GLARGINE 100 [IU]/ML
16 INJECTION, SOLUTION SUBCUTANEOUS
Status: DISCONTINUED | OUTPATIENT
Start: 2019-06-21 | End: 2019-06-23 | Stop reason: HOSPADM

## 2019-06-21 RX ORDER — POTASSIUM CHLORIDE 20 MEQ/1
40 TABLET, EXTENDED RELEASE ORAL ONCE
Status: ACTIVE | OUTPATIENT
Start: 2019-06-21 | End: 2019-06-22

## 2019-06-21 RX ORDER — SODIUM CHLORIDE AND POTASSIUM CHLORIDE .9; .15 G/100ML; G/100ML
SOLUTION INTRAVENOUS CONTINUOUS
Status: DISCONTINUED | OUTPATIENT
Start: 2019-06-21 | End: 2019-06-23 | Stop reason: HOSPADM

## 2019-06-21 RX ORDER — ATORVASTATIN CALCIUM 40 MG/1
40 TABLET, FILM COATED ORAL
Status: DISCONTINUED | OUTPATIENT
Start: 2019-06-21 | End: 2019-06-23 | Stop reason: HOSPADM

## 2019-06-21 RX ORDER — AMLODIPINE BESYLATE 5 MG/1
5 TABLET ORAL DAILY
Status: DISCONTINUED | OUTPATIENT
Start: 2019-06-21 | End: 2019-06-23 | Stop reason: HOSPADM

## 2019-06-21 RX ORDER — TRAMADOL HYDROCHLORIDE 50 MG/1
50 TABLET ORAL
Status: DISCONTINUED | OUTPATIENT
Start: 2019-06-21 | End: 2019-06-23 | Stop reason: HOSPADM

## 2019-06-21 RX ORDER — FOLIC ACID 1 MG/1
1 TABLET ORAL DAILY
Status: DISCONTINUED | OUTPATIENT
Start: 2019-06-22 | End: 2019-06-23 | Stop reason: HOSPADM

## 2019-06-21 RX ORDER — ATENOLOL 100 MG/1
100 TABLET ORAL DAILY
Status: DISCONTINUED | OUTPATIENT
Start: 2019-06-21 | End: 2019-06-23 | Stop reason: HOSPADM

## 2019-06-21 RX ADMIN — INSULIN LISPRO 5 UNITS: 100 INJECTION, SOLUTION INTRAVENOUS; SUBCUTANEOUS at 08:42

## 2019-06-21 RX ADMIN — ATORVASTATIN CALCIUM 40 MG: 40 TABLET, FILM COATED ORAL at 08:44

## 2019-06-21 RX ADMIN — CEFTRIAXONE 1 G: 1 INJECTION, POWDER, FOR SOLUTION INTRAMUSCULAR; INTRAVENOUS at 12:25

## 2019-06-21 RX ADMIN — ENOXAPARIN SODIUM 40 MG: 40 INJECTION, SOLUTION INTRAVENOUS; SUBCUTANEOUS at 21:29

## 2019-06-21 RX ADMIN — POTASSIUM CHLORIDE 10 MEQ: 10 INJECTION, SOLUTION INTRAVENOUS at 01:05

## 2019-06-21 RX ADMIN — SODIUM CHLORIDE 100 ML/HR: 900 INJECTION, SOLUTION INTRAVENOUS at 01:45

## 2019-06-21 RX ADMIN — ATENOLOL 100 MG: 100 TABLET ORAL at 08:43

## 2019-06-21 RX ADMIN — INSULIN LISPRO 2 UNITS: 100 INJECTION, SOLUTION INTRAVENOUS; SUBCUTANEOUS at 17:04

## 2019-06-21 RX ADMIN — INSULIN GLARGINE 16 UNITS: 100 INJECTION, SOLUTION SUBCUTANEOUS at 21:28

## 2019-06-21 RX ADMIN — AMLODIPINE BESYLATE 5 MG: 5 TABLET ORAL at 08:43

## 2019-06-21 RX ADMIN — INSULIN LISPRO 3 UNITS: 100 INJECTION, SOLUTION INTRAVENOUS; SUBCUTANEOUS at 11:31

## 2019-06-21 RX ADMIN — POTASSIUM CHLORIDE 40 MEQ: 1.5 POWDER, FOR SOLUTION ORAL at 16:38

## 2019-06-21 RX ADMIN — SODIUM CHLORIDE AND POTASSIUM CHLORIDE: 9; 1.49 INJECTION, SOLUTION INTRAVENOUS at 16:17

## 2019-06-21 RX ADMIN — SODIUM CHLORIDE 100 ML/HR: 900 INJECTION, SOLUTION INTRAVENOUS at 12:28

## 2019-06-21 NOTE — PROGRESS NOTES
Hospitalist Progress Note    NAME: Mynor Hogue   :  1965   MRN:  [id number]     Admit date: 2019    Today's date: 19    PCP: MD Ella Zuniga M.D. Cell [phone]    Assessment / Plan:  Acute Pyelonephritis POA  Leukocytosis POA WBC 14.9, no SIRS criteria  ceftriaxone  Urine Culture with log 5 GNR  BC so far negative  Immunosuppressed on simponi  IVF     Acute kidney injury POA admit BUN/creat 50/2.17  Hypokalemia  Hyponatremia  Nausea/Vomting POA resolved  IVF, creatinine still increasing  Off diuretics, ARB  Additional KCL in IVF and PO  CT abdomen/pelvis without hydronephrosis  Hold nephrotoxins     RA POA  Started on Simponi dec 2018  Hold methotrexate  Resume tramadol     DM type 2 POA  Oral agent son hold with reduced GFR  , 250, 203, 176  Lantus as needed     Essential hypertension POA  atenolol  Resume norvasc at lower doses. Obesity POA Body mass index is 40.61 kg/m².     Code Status: Full Code    Surrogate Decision Maker: Daughter Joceline Mccann      DVT Prophylaxis: Lovenox  GI Prophylaxis: not indicated     Subjective:     Chief Complaint / Reason for Physician Visit  \"I still feel weak and tired\". Discussed with RN events overnight. No further N/V, no diarrhea  Denies flank pain    Review of Systems:  Symptom Y/N Comments  Symptom Y/N Comments   Fever/Chills n   Chest Pain n    Poor Appetite    Edema     Cough n   Abdominal Pain n    Sputum    Joint Pain     SOB/SHULTZ n   Headache     Nausea/vomit n resolved  Tolerating PT/OT     Diarrhea n   Tolerating Diet y    Constipation    Other       Could NOT obtain due to:      Objective:     VITALS:   Last 24hrs VS reviewed since prior progress note.  Most recent are:  Patient Vitals for the past 24 hrs:   Temp Pulse Resp BP SpO2   19 1503 97.9 °F (36.6 °C) 65 18 142/74 100 %   19 1113 98.8 °F (37.1 °C) 69 18 140/74 100 %   19 0825 98.5 °F (36.9 °C) 73 18 155/78 95 %   06/21/19 0339 98.3 °F (36.8 °C) 68 18 117/63 100 %   06/20/19 2322 97.8 °F (36.6 °C) 62 18 99/60 99 %   06/20/19 2026 97.7 °F (36.5 °C) 86 20 130/72 97 %       Intake/Output Summary (Last 24 hours) at 6/21/2019 1812  Last data filed at 6/20/2019 2100  Gross per 24 hour   Intake 100 ml   Output    Net 100 ml        Wt Readings from Last 12 Encounters:   06/20/19 124.7 kg (275 lb)   07/03/18 63.5 kg (139 lb 15.9 oz)   06/21/18 63.5 kg (140 lb)   06/06/18 117.9 kg (260 lb)   12/18/17 121.9 kg (268 lb 11.9 oz)   08/17/17 124.8 kg (275 lb 2.2 oz)   02/03/17 129 kg (284 lb 6.3 oz)   07/19/15 130.6 kg (287 lb 14.7 oz)   01/23/14 120.2 kg (265 lb)   06/10/13 122.5 kg (270 lb 1 oz)   06/08/13 125.1 kg (275 lb 12.7 oz)   04/12/13 129.3 kg (285 lb)       PHYSICAL EXAM:  General: WD, WN. Alert, cooperative, no acute distress    EENT:  PERRL. Anicteric sclerae. MMM  Neck:  No meningismus, no thyromegaly  Resp:  CTA bilaterally, no wheezing or rales. No accessory muscle use  CV:  Regular  rhythm,  No edema  GI:  Soft, Non distended, Non tender.  +Bowel sounds, no rebound  LN:  No cervical or inguinal QUINTON  Neurologic:  Alert and oriented X 3, normal speech, non focal motor exam  Psych:   Good insight. Not anxious nor agitated  Skin:  No rashes. No jaundice    Reviewed most current lab test results and cultures  YES  Reviewed most current radiology test results   YES  Review and summation of old records today    NO  Reviewed patient's current orders and MAR    YES  PMH/SH reviewed - no change compared to H&P  ________________________________________________________________________  Care Plan discussed with:    Comments   Patient x    Family  x daughter   RN x    Care Manager     Consultant                        Multidiciplinary team rounds were held today with , nursing, pharmacist and clinical coordinator. Patient's plan of care was discussed; medications were reviewed and discharge planning was addressed. ________________________________________________________________________  Total NON critical care TIME:  35  Minutes    Total CRITICAL CARE TIME Spent:   Minutes non procedure based      Comments   >50% of visit spent in counseling and coordination of care     ________________________________________________________________________  Pippa Muir MD     Procedures: see electronic medical records for all procedures/Xrays and details which were not copied into this note but were reviewed prior to creation of Plan. LABS:  I reviewed today's most current labs and imaging studies.   Pertinent labs include:  Recent Labs     06/21/19  0313 06/20/19  1202   WBC 12.1* 14.9*   HGB 9.7* 10.8*   HCT 27.4* 31.7*   * 132*     Recent Labs     06/21/19  0313 06/20/19  1531 06/20/19  1202   * 126* 125*   K 3.2* 2.6* 2.8*   CL 96* 94* 90*   CO2 23 22 22   * 244* 248*   BUN 54* 49* 50*   CREA 2.31* 2.04* 2.17*   CA 8.4* 7.8* 8.9   MG  --  2.0  --    ALB 2.4* 2.3* 2.8*   TBILI 1.2* 1.3* 1.8*   SGOT 27 21 26   ALT 20 15 20

## 2019-06-21 NOTE — PROGRESS NOTES
Spiritual Care Partner Volunteer visited patient in [address] on June 21, 2019. Documented by:  [facility] [phone]

## 2019-06-21 NOTE — PROGRESS NOTES
Primary Nurse Valentino Gutierrez RN and Sheela Burgess RN performed a dual skin assessment on this patient No impairment noted  Darryl score is 20    * Scar to left hip from recent hip replacement

## 2019-06-21 NOTE — PROGRESS NOTES
2035: Called Dr. Flora Joyner in ED to get pain medication for pt. Pt. Arrived to the floor, moaning, in excruciating pain in her Abdomen. MD ordered morphine but said to try the tylenol first.     0700: Bedside shift change report given to [facility] (oncoming nurse) by Amita Zhu (offgoing nurse). Report included the following information SBAR, Kardex, Procedure Summary, Intake/Output, MAR and Recent Results.

## 2019-06-22 LAB
ALBUMIN SERPL-MCNC: 2.1 G/DL (ref 3.5–5)
ALBUMIN/GLOB SERPL: 0.5 {RATIO} (ref 1.1–2.2)
ALP SERPL-CCNC: 98 U/L (ref 45–117)
ALT SERPL-CCNC: 49 U/L (ref 12–78)
ANION GAP SERPL CALC-SCNC: 8 MMOL/L (ref 5–15)
AST SERPL-CCNC: 88 U/L (ref 15–37)
BACTERIA SPEC CULT: ABNORMAL
BASOPHILS # BLD: 0 K/UL (ref 0–0.1)
BASOPHILS NFR BLD: 0 % (ref 0–1)
BILIRUB SERPL-MCNC: 0.7 MG/DL (ref 0.2–1)
BUN SERPL-MCNC: 44 MG/DL (ref 6–20)
BUN/CREAT SERPL: 25 (ref 12–20)
CALCIUM SERPL-MCNC: 8 MG/DL (ref 8.5–10.1)
CC UR VC: ABNORMAL
CHLORIDE SERPL-SCNC: 104 MMOL/L (ref 97–108)
CK MB CFR SERPL CALC: NORMAL % (ref 0–2.5)
CK MB SERPL-MCNC: <1 NG/ML (ref 5–25)
CK SERPL-CCNC: 56 U/L (ref 26–192)
CO2 SERPL-SCNC: 20 MMOL/L (ref 21–32)
CREAT SERPL-MCNC: 1.79 MG/DL (ref 0.55–1.02)
DIFFERENTIAL METHOD BLD: ABNORMAL
EOSINOPHIL # BLD: 0.4 K/UL (ref 0–0.4)
EOSINOPHIL NFR BLD: 4 % (ref 0–7)
ERYTHROCYTE [DISTWIDTH] IN BLOOD BY AUTOMATED COUNT: 14.7 % (ref 11.5–14.5)
GLOBULIN SER CALC-MCNC: 4.4 G/DL (ref 2–4)
GLUCOSE BLD STRIP.AUTO-MCNC: 129 MG/DL (ref 65–100)
GLUCOSE BLD STRIP.AUTO-MCNC: 145 MG/DL (ref 65–100)
GLUCOSE BLD STRIP.AUTO-MCNC: 145 MG/DL (ref 65–100)
GLUCOSE BLD STRIP.AUTO-MCNC: 148 MG/DL (ref 65–100)
GLUCOSE SERPL-MCNC: 141 MG/DL (ref 65–100)
HCT VFR BLD AUTO: 24.4 % (ref 35–47)
HGB BLD-MCNC: 8.5 G/DL (ref 11.5–16)
IMM GRANULOCYTES # BLD AUTO: 0 K/UL (ref 0–0.04)
IMM GRANULOCYTES NFR BLD AUTO: 0 % (ref 0–0.5)
LIPASE SERPL-CCNC: 370 U/L (ref 73–393)
LYMPHOCYTES # BLD: 1 K/UL (ref 0.8–3.5)
LYMPHOCYTES NFR BLD: 10 % (ref 12–49)
MAGNESIUM SERPL-MCNC: 2 MG/DL (ref 1.6–2.4)
MCH RBC QN AUTO: 31.8 PG (ref 26–34)
MCHC RBC AUTO-ENTMCNC: 34.8 G/DL (ref 30–36.5)
MCV RBC AUTO: 91.4 FL (ref 80–99)
MONOCYTES # BLD: 1.6 K/UL (ref 0–1)
MONOCYTES NFR BLD: 17 % (ref 5–13)
NEUTS BAND NFR BLD MANUAL: 4 %
NEUTS SEG # BLD: 6.7 K/UL (ref 1.8–8)
NEUTS SEG NFR BLD: 65 % (ref 32–75)
NRBC # BLD: 0 K/UL (ref 0–0.01)
NRBC BLD-RTO: 0 PER 100 WBC
PLATELET # BLD AUTO: 191 K/UL (ref 150–400)
PMV BLD AUTO: 12.6 FL (ref 8.9–12.9)
POTASSIUM SERPL-SCNC: 3.4 MMOL/L (ref 3.5–5.1)
PROT SERPL-MCNC: 6.5 G/DL (ref 6.4–8.2)
RBC # BLD AUTO: 2.67 M/UL (ref 3.8–5.2)
RBC MORPH BLD: ABNORMAL
SERVICE CMNT-IMP: ABNORMAL
SODIUM SERPL-SCNC: 132 MMOL/L (ref 136–145)
TROPONIN I SERPL-MCNC: <0.05 NG/ML
WBC # BLD AUTO: 9.7 K/UL (ref 3.6–11)

## 2019-06-22 PROCEDURE — 74011636637 HC RX REV CODE- 636/637: Performed by: INTERNAL MEDICINE

## 2019-06-22 PROCEDURE — 74011250636 HC RX REV CODE- 250/636: Performed by: INTERNAL MEDICINE

## 2019-06-22 PROCEDURE — 74011250637 HC RX REV CODE- 250/637: Performed by: INTERNAL MEDICINE

## 2019-06-22 PROCEDURE — 83690 ASSAY OF LIPASE: CPT

## 2019-06-22 PROCEDURE — 85025 COMPLETE CBC W/AUTO DIFF WBC: CPT

## 2019-06-22 PROCEDURE — 83735 ASSAY OF MAGNESIUM: CPT

## 2019-06-22 PROCEDURE — 82962 GLUCOSE BLOOD TEST: CPT

## 2019-06-22 PROCEDURE — 82550 ASSAY OF CK (CPK): CPT

## 2019-06-22 PROCEDURE — 65660000000 HC RM CCU STEPDOWN

## 2019-06-22 PROCEDURE — 84484 ASSAY OF TROPONIN QUANT: CPT

## 2019-06-22 PROCEDURE — 36415 COLL VENOUS BLD VENIPUNCTURE: CPT

## 2019-06-22 PROCEDURE — 80053 COMPREHEN METABOLIC PANEL: CPT

## 2019-06-22 PROCEDURE — 74011000258 HC RX REV CODE- 258: Performed by: INTERNAL MEDICINE

## 2019-06-22 RX ORDER — POTASSIUM CHLORIDE 1.5 G/1.77G
40 POWDER, FOR SOLUTION ORAL 2 TIMES DAILY WITH MEALS
Status: COMPLETED | OUTPATIENT
Start: 2019-06-22 | End: 2019-06-22

## 2019-06-22 RX ORDER — POTASSIUM CHLORIDE 20 MEQ/1
40 TABLET, EXTENDED RELEASE ORAL 2 TIMES DAILY
Status: DISCONTINUED | OUTPATIENT
Start: 2019-06-22 | End: 2019-06-22 | Stop reason: SDUPTHER

## 2019-06-22 RX ORDER — LANOLIN ALCOHOL/MO/W.PET/CERES
3 CREAM (GRAM) TOPICAL
Status: DISCONTINUED | OUTPATIENT
Start: 2019-06-22 | End: 2019-06-23 | Stop reason: HOSPADM

## 2019-06-22 RX ADMIN — MELATONIN 3 MG: at 00:49

## 2019-06-22 RX ADMIN — INSULIN LISPRO 2 UNITS: 100 INJECTION, SOLUTION INTRAVENOUS; SUBCUTANEOUS at 18:09

## 2019-06-22 RX ADMIN — AMLODIPINE BESYLATE 5 MG: 5 TABLET ORAL at 09:02

## 2019-06-22 RX ADMIN — CEFTRIAXONE 1 G: 1 INJECTION, POWDER, FOR SOLUTION INTRAMUSCULAR; INTRAVENOUS at 15:56

## 2019-06-22 RX ADMIN — ATORVASTATIN CALCIUM 40 MG: 40 TABLET, FILM COATED ORAL at 09:02

## 2019-06-22 RX ADMIN — INSULIN LISPRO 2 UNITS: 100 INJECTION, SOLUTION INTRAVENOUS; SUBCUTANEOUS at 09:01

## 2019-06-22 RX ADMIN — ENOXAPARIN SODIUM 40 MG: 40 INJECTION, SOLUTION INTRAVENOUS; SUBCUTANEOUS at 22:19

## 2019-06-22 RX ADMIN — SODIUM CHLORIDE AND POTASSIUM CHLORIDE: 9; 1.49 INJECTION, SOLUTION INTRAVENOUS at 00:49

## 2019-06-22 RX ADMIN — ACETAMINOPHEN 1000 MG: 500 TABLET ORAL at 09:02

## 2019-06-22 RX ADMIN — INSULIN LISPRO 2 UNITS: 100 INJECTION, SOLUTION INTRAVENOUS; SUBCUTANEOUS at 12:17

## 2019-06-22 RX ADMIN — POTASSIUM CHLORIDE 40 MEQ: 1.5 POWDER, FOR SOLUTION ORAL at 18:09

## 2019-06-22 RX ADMIN — POTASSIUM CHLORIDE 40 MEQ: 1.5 POWDER, FOR SOLUTION ORAL at 10:57

## 2019-06-22 RX ADMIN — INSULIN GLARGINE 16 UNITS: 100 INJECTION, SOLUTION SUBCUTANEOUS at 22:19

## 2019-06-22 RX ADMIN — ATENOLOL 100 MG: 100 TABLET ORAL at 09:02

## 2019-06-22 RX ADMIN — FOLIC ACID 1 MG: 1 TABLET ORAL at 09:02

## 2019-06-22 RX ADMIN — SODIUM CHLORIDE AND POTASSIUM CHLORIDE: 9; 1.49 INJECTION, SOLUTION INTRAVENOUS at 22:25

## 2019-06-22 NOTE — PROGRESS NOTES
Hospitalist Progress Note    NAME: Leigha Recinos   :  1965   MRN:  [id number]     Admit date: 2019    Today's date: 19    PCP: MD Radha Ward M.D. Cell [phone]    Assessment / Plan:  Acute Pyelonephritis POA  Leukocytosis POA WBC 14.9, no SIRS criteria  Immunosuppressed on simponi and methotrexate  Ceftriaxone IV day 3  Urine Culture with log 5 GNR, await final culturee  BC so far negative  Immunosuppressed on simponi  IVF     Acute kidney injury POA admit BUN/creat 50/2.17  Hypokalemia  Hyponatremia  Nausea/Vomting POA resolved  IVF, creatinine improving  Off diuretics, ARB  Additional KCL in IVF and PO x 2 today  CT abdomen/pelvis without hydronephrosis  Hold nephrotoxins  Serial labs     RA POA  Started on Simponi dec 2018  Hold methotrexate  Resume tramadol     DM type 2 POA  Oral agent son hold with reduced GFR  , 250, 203, 176, 160, 148  Lantus   SSI     Essential hypertension POA  atenolol  Lower dose norvasc    Obesity POA Body mass index is 40.61 kg/m².     Code Status: Full Code    Surrogate Decision Maker: Daughter Anna Mcgovern      DVT Prophylaxis: Lovenox  GI Prophylaxis: not indicated     Subjective:     Chief Complaint / Reason for Physician Visit  \"Still feel weak and tired, but a bit better\". Discussed with RN events overnight. No further N/V, no diarrhea, stomach feels bloated or gassy  Denies flank pain, HA, CP or SOB  Low grade fever overnight    Review of Systems:  Symptom Y/N Comments  Symptom Y/N Comments   Fever/Chills n   Chest Pain n    Poor Appetite    Edema     Cough n   Abdominal Pain n    Sputum    Joint Pain     SOB/SHULTZ n   Headache     Nausea/vomit n resolved  Tolerating PT/OT     Diarrhea n   Tolerating Diet y    Constipation    Other       Could NOT obtain due to:      Objective:     VITALS:   Last 24hrs VS reviewed since prior progress note.  Most recent are:  Patient Vitals for the past 24 hrs: Temp Pulse Resp BP SpO2   06/22/19 0741 99 °F (37.2 °C) 64 18 145/61 100 %   06/22/19 0345 99.9 °F (37.7 °C) 62 18 (!) 142/91 96 %   06/21/19 2331 99.6 °F (37.6 °C) 62 18 168/66 95 %   06/21/19 1921 (!) 100.7 °F (38.2 °C) 65 18 144/57 100 %   06/21/19 1503 97.9 °F (36.6 °C) 65 18 142/74 100 %   06/21/19 1113 98.8 °F (37.1 °C) 69 18 140/74 100 %       Intake/Output Summary (Last 24 hours) at 6/22/2019 1020  Last data filed at 6/22/2019 3449  Gross per 24 hour   Intake 1822.92 ml   Output    Net 1822.92 ml        Wt Readings from Last 12 Encounters:   06/20/19 124.7 kg (275 lb)   07/03/18 63.5 kg (139 lb 15.9 oz)   06/21/18 63.5 kg (140 lb)   06/06/18 117.9 kg (260 lb)   12/18/17 121.9 kg (268 lb 11.9 oz)   08/17/17 124.8 kg (275 lb 2.2 oz)   02/03/17 129 kg (284 lb 6.3 oz)   07/19/15 130.6 kg (287 lb 14.7 oz)   01/23/14 120.2 kg (265 lb)   06/10/13 122.5 kg (270 lb 1 oz)   06/08/13 125.1 kg (275 lb 12.7 oz)   04/12/13 129.3 kg (285 lb)       PHYSICAL EXAM:  General: WD, WN. Alert, cooperative, no acute distress    EENT:  PERRL. Anicteric sclerae. MMM  Neck:  No meningismus, no thyromegaly  Resp:  CTA bilaterally, no wheezing or rales. No accessory muscle use  CV:  Regular  rhythm,  No edema  GI:  Soft, Non distended, Non tender.  +Bowel sounds, no rebound  Neurologic:  Alert and oriented X 3, normal speech, non focal motor exam  Psych:   Good insight. Not anxious nor agitated  Skin:  No rashes.   No jaundice    Reviewed most current lab test results and cultures  YES  Reviewed most current radiology test results   YES  Review and summation of old records today    NO  Reviewed patient's current orders and MAR    YES  PMH/SH reviewed - no change compared to H&P  ________________________________________________________________________  Care Plan discussed with:    Comments   Patient x    Family      RN x    Care Manager     Consultant                        Multidiciplinary team rounds were held today with case manager, nursing, pharmacist and clinical coordinator. Patient's plan of care was discussed; medications were reviewed and discharge planning was addressed. ________________________________________________________________________  Total NON critical care TIME:  25  Minutes    Total CRITICAL CARE TIME Spent:   Minutes non procedure based      Comments   >50% of visit spent in counseling and coordination of care     ________________________________________________________________________  Jim Ellison MD     Procedures: see electronic medical records for all procedures/Xrays and details which were not copied into this note but were reviewed prior to creation of Plan. LABS:  I reviewed today's most current labs and imaging studies.   Pertinent labs include:  Recent Labs     06/22/19  0356 06/21/19  0313 06/20/19  1202   WBC 9.7 12.1* 14.9*   HGB 8.5* 9.7* 10.8*   HCT 24.4* 27.4* 31.7*    146* 132*     Recent Labs     06/22/19  0356 06/21/19  0313 06/20/19  1531   * 130* 126*   K 3.4* 3.2* 2.6*    96* 94*   CO2 20* 23 22   * 232* 244*   BUN 44* 54* 49*   CREA 1.79* 2.31* 2.04*   CA 8.0* 8.4* 7.8*   MG 2.0  --  2.0   ALB 2.1* 2.4* 2.3*   TBILI 0.7 1.2* 1.3*   SGOT 88* 27 21   ALT 49 20 15

## 2019-06-22 NOTE — PROGRESS NOTES
Physical therapy  Order received today for PT consult. Chart reviewed. Patient was supine in bed with a child playing on phone on sofa. Pt knows what PT's role is and stated that she just couldn't stand up today and was feeling so badly she had to stay lying down. Pt refused therapy today. Will defer & check back with patient tomorrow or Monday.

## 2019-06-22 NOTE — PROGRESS NOTES
Antimicrobial Stewardship    Indication:  UTI  Micro:  UCx Ecoli - final    Plan:   No moody. Provided 3 day stop date since MD progress note indicates 3 days duration from 6/20 start date.     Thank you,  Dina Patrick, [facility]

## 2019-06-22 NOTE — PROGRESS NOTES
Pt states she is having chills and cannot get warm. Attempted to check oral temp, pt began to gag and state she gags with oral temperature. Axillary temp is 98.1, informed pt that this may not be accurate and that we may have to do rectal temp, pt declined and stated she will try to have oral temperature taken. Attempted, pt began to gag again and asked that nurse give her 5 mins.

## 2019-06-23 VITALS
BODY MASS INDEX: 40.73 KG/M2 | DIASTOLIC BLOOD PRESSURE: 44 MMHG | RESPIRATION RATE: 18 BRPM | WEIGHT: 275 LBS | TEMPERATURE: 98.5 F | HEART RATE: 66 BPM | SYSTOLIC BLOOD PRESSURE: 132 MMHG | HEIGHT: 69 IN | OXYGEN SATURATION: 93 %

## 2019-06-23 LAB
ALBUMIN SERPL-MCNC: 2.2 G/DL (ref 3.5–5)
ALBUMIN/GLOB SERPL: 0.6 {RATIO} (ref 1.1–2.2)
ALP SERPL-CCNC: 102 U/L (ref 45–117)
ALT SERPL-CCNC: 45 U/L (ref 12–78)
ANION GAP SERPL CALC-SCNC: 11 MMOL/L (ref 5–15)
AST SERPL-CCNC: 46 U/L (ref 15–37)
BASOPHILS # BLD: 0 K/UL (ref 0–0.1)
BASOPHILS NFR BLD: 0 % (ref 0–1)
BILIRUB SERPL-MCNC: 0.5 MG/DL (ref 0.2–1)
BUN SERPL-MCNC: 29 MG/DL (ref 6–20)
BUN/CREAT SERPL: 20 (ref 12–20)
CALCIUM SERPL-MCNC: 7.9 MG/DL (ref 8.5–10.1)
CHLORIDE SERPL-SCNC: 109 MMOL/L (ref 97–108)
CO2 SERPL-SCNC: 19 MMOL/L (ref 21–32)
CREAT SERPL-MCNC: 1.46 MG/DL (ref 0.55–1.02)
DIFFERENTIAL METHOD BLD: ABNORMAL
EOSINOPHIL # BLD: 0.6 K/UL (ref 0–0.4)
EOSINOPHIL NFR BLD: 4 % (ref 0–7)
ERYTHROCYTE [DISTWIDTH] IN BLOOD BY AUTOMATED COUNT: 15.1 % (ref 11.5–14.5)
GLOBULIN SER CALC-MCNC: 3.4 G/DL (ref 2–4)
GLUCOSE BLD STRIP.AUTO-MCNC: 107 MG/DL (ref 65–100)
GLUCOSE BLD STRIP.AUTO-MCNC: 166 MG/DL (ref 65–100)
GLUCOSE SERPL-MCNC: 127 MG/DL (ref 65–100)
HCT VFR BLD AUTO: 25.6 % (ref 35–47)
HGB BLD-MCNC: 8.7 G/DL (ref 11.5–16)
IMM GRANULOCYTES # BLD AUTO: 0 K/UL (ref 0–0.04)
IMM GRANULOCYTES NFR BLD AUTO: 0 % (ref 0–0.5)
LYMPHOCYTES # BLD: 2.2 K/UL (ref 0.8–3.5)
LYMPHOCYTES NFR BLD: 14 % (ref 12–49)
MCH RBC QN AUTO: 31.3 PG (ref 26–34)
MCHC RBC AUTO-ENTMCNC: 34 G/DL (ref 30–36.5)
MCV RBC AUTO: 92.1 FL (ref 80–99)
METAMYELOCYTES NFR BLD MANUAL: 1 %
MONOCYTES # BLD: 1.2 K/UL (ref 0–1)
MONOCYTES NFR BLD: 8 % (ref 5–13)
NEUTS BAND NFR BLD MANUAL: 1 %
NEUTS SEG # BLD: 11.2 K/UL (ref 1.8–8)
NEUTS SEG NFR BLD: 72 % (ref 32–75)
NRBC # BLD: 0 K/UL (ref 0–0.01)
NRBC BLD-RTO: 0 PER 100 WBC
PLATELET # BLD AUTO: 271 K/UL (ref 150–400)
PMV BLD AUTO: 11.7 FL (ref 8.9–12.9)
POTASSIUM SERPL-SCNC: 3.4 MMOL/L (ref 3.5–5.1)
PROT SERPL-MCNC: 5.6 G/DL (ref 6.4–8.2)
RBC # BLD AUTO: 2.78 M/UL (ref 3.8–5.2)
RBC MORPH BLD: ABNORMAL
SERVICE CMNT-IMP: ABNORMAL
SERVICE CMNT-IMP: ABNORMAL
SODIUM SERPL-SCNC: 139 MMOL/L (ref 136–145)
WBC # BLD AUTO: 15.4 K/UL (ref 3.6–11)

## 2019-06-23 PROCEDURE — 74011250637 HC RX REV CODE- 250/637: Performed by: INTERNAL MEDICINE

## 2019-06-23 PROCEDURE — 80053 COMPREHEN METABOLIC PANEL: CPT

## 2019-06-23 PROCEDURE — 74011636637 HC RX REV CODE- 636/637: Performed by: INTERNAL MEDICINE

## 2019-06-23 PROCEDURE — 36415 COLL VENOUS BLD VENIPUNCTURE: CPT

## 2019-06-23 PROCEDURE — 85025 COMPLETE CBC W/AUTO DIFF WBC: CPT

## 2019-06-23 PROCEDURE — 82962 GLUCOSE BLOOD TEST: CPT

## 2019-06-23 PROCEDURE — 74011000258 HC RX REV CODE- 258: Performed by: INTERNAL MEDICINE

## 2019-06-23 PROCEDURE — 74011250636 HC RX REV CODE- 250/636: Performed by: INTERNAL MEDICINE

## 2019-06-23 RX ORDER — POTASSIUM CHLORIDE 20 MEQ/1
40 TABLET, EXTENDED RELEASE ORAL DAILY
Qty: 7 TAB | Refills: 0 | Status: SHIPPED | OUTPATIENT
Start: 2019-06-23

## 2019-06-23 RX ORDER — POTASSIUM CHLORIDE 20 MEQ/1
40 TABLET, EXTENDED RELEASE ORAL
Status: DISCONTINUED | OUTPATIENT
Start: 2019-06-23 | End: 2019-06-23

## 2019-06-23 RX ORDER — POTASSIUM CHLORIDE 1.5 G/1.77G
40 POWDER, FOR SOLUTION ORAL
Status: COMPLETED | OUTPATIENT
Start: 2019-06-23 | End: 2019-06-23

## 2019-06-23 RX ORDER — CEPHALEXIN 500 MG/1
500 CAPSULE ORAL 3 TIMES DAILY
Qty: 30 CAP | Refills: 0 | Status: SHIPPED | OUTPATIENT
Start: 2019-06-23 | End: 2019-07-03

## 2019-06-23 RX ADMIN — FOLIC ACID 1 MG: 1 TABLET ORAL at 08:14

## 2019-06-23 RX ADMIN — INSULIN LISPRO 2 UNITS: 100 INJECTION, SOLUTION INTRAVENOUS; SUBCUTANEOUS at 11:59

## 2019-06-23 RX ADMIN — AMLODIPINE BESYLATE 5 MG: 5 TABLET ORAL at 08:14

## 2019-06-23 RX ADMIN — SODIUM CHLORIDE AND POTASSIUM CHLORIDE: 9; 1.49 INJECTION, SOLUTION INTRAVENOUS at 12:54

## 2019-06-23 RX ADMIN — ATORVASTATIN CALCIUM 40 MG: 40 TABLET, FILM COATED ORAL at 08:14

## 2019-06-23 RX ADMIN — POTASSIUM CHLORIDE 40 MEQ: 1.5 POWDER, FOR SOLUTION ORAL at 14:45

## 2019-06-23 RX ADMIN — ACETAMINOPHEN 1000 MG: 500 TABLET ORAL at 08:14

## 2019-06-23 RX ADMIN — ATENOLOL 100 MG: 100 TABLET ORAL at 08:14

## 2019-06-23 RX ADMIN — CEFTRIAXONE 1 G: 1 INJECTION, POWDER, FOR SOLUTION INTRAMUSCULAR; INTRAVENOUS at 14:16

## 2019-06-23 RX ADMIN — ACETAMINOPHEN 650 MG: 160 SOLUTION ORAL at 00:53

## 2019-06-23 NOTE — DISCHARGE INSTRUCTIONS
Patient Discharge Instructions    Najma Banerjee / [id number] : 1965    Admitted 2019 Discharged: 2019         DISCHARGE DIAGNOSIS:       Pyelonephritis (2019)      Acute kidney injury      Hypokalemia           What to do at Home    1. Recommended diet: Diabetic Diet    2. Recommended activity: Activity as tolerated    3. If you experience any of the following symptoms then please call your primary care physician or return to the emergency room if you cannot get hold of your doctor:   Fevers > 100.5, chills   Nausea or vomiting, persistent diarrhea > 24 hours   Blood in stool or black stools   Chest pain or SOB      Follow-up Information     Follow up With Specialties Details Why Contact Info    ABUNDIO Iglesias MD Internal Medicine Schedule an appointment as soon as possible for a visit in 5 days  [address] [place] [address]  [phone]          Check temp 3 times per day for next week, call PCP if greater than 101    Contact PCP if you develop persistent watery diarrhea lasting longer than 24 hours    Start antibiotics keflex in AM    Hold hyzaar and janumet till you see Dr Aydee Ayers obtained by :  I understand that if any problems occur once I am at home I am to contact my physician. I understand and acknowledge receipt of the instructions indicated above. Physician's or R.N.'s Signature                                                                  Date/Time                                                                                                                                              Patient or Representative Signature                                                          Date/Time    .      Kidney Infection: Care Instructions  Your Care Instructions    A kidney infection (pyelonephritis) is a type of urinary tract infection, or UTI. Most UTIs are bladder infections. Kidney infections tend to make people much sicker than bladder infections do. A kidney infection is also more serious because it can cause lasting damage if it is not treated quickly. Follow-up care is a key part of your treatment and safety. Be sure to make and go to all appointments, and call your doctor if you are having problems. It's also a good idea to know your test results and keep a list of the medicines you take. How can you care for yourself at home? · Take your antibiotics as directed. Do not stop taking them just because you feel better. You need to take the full course of antibiotics. · Drink plenty of water, enough so that your urine is light yellow or clear like water. This may help wash out bacteria that are causing the infection. If you have kidney, heart, or liver disease and have to limit fluids, talk with your doctor before you increase the amount of fluids you drink. · Urinate often. Try to empty your bladder each time. · To relieve pain, take a hot shower or lay a heating pad (set on low) over your lower belly. Never go to sleep with a heating pad in place. Put a thin cloth between the heating pad and your skin. To help prevent kidney infections  · Drink plenty of water each day. This helps you urinate often, which clears bacteria from your system. If you have kidney, heart, or liver disease and have to limit fluids, talk with your doctor before you increase the amount of fluids you drink. · Urinate when you have the urge. Do not hold your urine for a long time. Urinate before you go to sleep. · If you have symptoms of a bladder infection, such as burning when you urinate or having to urinate often, call your doctor so you can treat the problem before it gets worse. If you do not treat a bladder infection quickly, it can spread to the kidney. · Men should keep the tip of the penis clean.   If you are a woman, keep these ideas in mind:  · Urinate right after you have sex. · Change sanitary pads often. Avoid douches, feminine hygiene sprays, and other feminine hygiene products that have deodorants. · After going to the bathroom, wipe from front to back. When should you call for help? Call your doctor now or seek immediate medical care if:    · You have symptoms that a kidney infection is getting worse. These may include:  ? Pain or burning when you urinate. ? A frequent need to urinate without being able to pass much urine. ? Pain in the flank, which is just below the rib cage and above the waist on either side of the back. ? Blood in the urine. ? A fever.     · You are vomiting or nauseated.    Watch closely for changes in your health, and be sure to contact your doctor if:    · You do not get better as expected. Where can you learn more? Go to http://nickie-marleny.info/. Enter P687 in the search box to learn more about \"Kidney Infection: Care Instructions. \"  Current as of: March 14, 2018  Content Version: 11.9  © 8203-1536 [employer]. Care instructions adapted under license by [employer] (which disclaims liability or warranty for this information). If you have questions about a medical condition or this instruction, always ask your healthcare professional. [address] any warranty or liability for your use of this information.

## 2019-06-23 NOTE — PROGRESS NOTES
Pt discharged home via family transportation. IV and telemetry discontinued. Pt discharge instructions and hard scripts included. Pt eager to leave hospital. Room checked for any left belongings; none found.

## 2019-06-23 NOTE — PROGRESS NOTES
Reason for Admission:  N/V                    RRAT Score: 11                    Plan for utilizing home health: Unknown at this time, PT consult pending. Patient will likely not require [facility] services as she is completely independent in her ADLs and IADLs                        Current Advanced Directive/Advance Care Plan: The patient is a full code and she does not have a MPOA/AD on-file at this time                          Transition of Care Plan:                    CM met with the patient at bedside to discuss dispo plan. The patient lives in a 2 level home with 3 steps to enter with her , daughter, and 3 grandchildren. She is completely independent in her ADLs and IADLs and uses no assistive device when ambulating. She is able to drive and her daughter will assist with transportation upon d/c. She is employed full-time with [employer]. She uses the [employer] for her medications. No acute CM needs were identified at this time. CM will continue to follow the patient for dispo planning. Care Management Interventions  PCP Verified by CM: Yes(The patient sees her PCP once every 6 weeks )  Mode of Transport at Discharge:  Other (see comment)(The patient's daughter will assist with d/c transportation )  Transition of Care Consult (CM Consult): Discharge Planning  MyChart Signup: No  Discharge Durable Medical Equipment: No  Physical Therapy Consult: Yes  Occupational Therapy Consult: No  Speech Therapy Consult: No  Current Support Network: Lives with Spouse(The patient's daughter and 3 grandchildren also live with her )  Confirm Follow Up Transport: Self  Plan discussed with Pt/Family/Caregiver: Yes  Freedom of Choice Offered: Yes  [place] Resource Information Provided?: No  Discharge Location  Discharge Placement: Home    Sarah gonsalez LCSW

## 2019-06-23 NOTE — PROGRESS NOTES
0045 - Pt very hard stick for labs. Attempted to obtain labs at right inner wrist. Only able to obtain pst tube. Pt did not want further attempts made. 0130 - Lab called to inform that pst had hemolyzed. Bedside shift change report given to Bean wilder (oncoming nurse) by Annemarie Simmons (offgoing nurse). Report included the following information SBAR, Kardex, Intake/Output, MAR and Recent Results.

## 2019-06-23 NOTE — DISCHARGE SUMMARY
Hospitalist Discharge Note    NAME: Julio Odonnell   :  1965   MRN:  [id number]     Admit date: 2019    Discharge date: 19    PCP: Young Gan MD    Discharge Diagnoses:    Acute Pyelonephritis POA    Leukocytosis POA WBC 14.9, no SIRS criteria    Immunosuppressed on simponi and methotrexate     Acute kidney injury POA admit BUN/creat 50/2.17    Hypokalemia K  2.6    Hyponatremia Na 125    Nausea/Vomting POA resolved     RA POA started on Simponi dec 2018, methotrexate     DM type 2 POA     Essential hypertension POA    Obesity POA Body mass index is 40.61 kg/m².     Code Status: Full Code    Surrogate Decision Maker: Daughter Chalo Ko      DVT Prophylaxis: Lovenox  GI Prophyla    Discharge Medications:  Current Discharge Medication List      START taking these medications    Details   potassium chloride (K-DUR, KLOR-CON) 20 mEq tablet Take 2 Tabs by mouth daily. Qty: 7 Tab, Refills: 0      cephALEXin (KEFLEX) 500 mg capsule Take 1 Cap by mouth three (3) times daily for 10 days. Qty: 30 Cap, Refills: 0         CONTINUE these medications which have NOT CHANGED    Details   amLODIPine (NORVASC) 10 mg tablet Take 10 mg by mouth daily. methotrexate (RHEUMATREX) 2.5 mg tablet Take 2.5 mg by mouth two (2) days a week. traMADol (ULTRAM) 50 mg tablet Take 50 mg by mouth every eight (8) hours as needed for Pain. folic acid (FOLVITE) 1 mg tablet Take 1 mg by mouth daily. diclofenac (VOLTAREN) 1 % gel Apply  to affected area four (4) times daily as needed for Pain. Apply to knees and shoulder      atorvastatin (LIPITOR) 40 mg tablet Take 40 mg by mouth daily (with breakfast). glipiZIDE (GLUCOTROL) 10 mg tablet Take 5 mg by mouth two (2) times a day.  TAKES IN AM, & AT LUNCH TIME-1-2 PM      atenolol (TENORMIN) 100 mg tablet TAKE ONE TABLET BY MOUTH ONCE DAILY ** NEED TO MAKE APPT**  Qty: 30 Tab, Refills: 0    Comments: She needs to be seen ASAP acetaminophen (TYLENOL EXTRA STRENGTH) 500 mg tablet Take 1,000 mg by mouth daily. STOP taking these medications       SITagliptin-metFORMIN (JANUMET)  mg per tablet Comments:   Reason for Stopping:         losartan-hydroCHLOROthiazide (HYZAAR) 100-25 mg per tablet Comments:   Reason for Stopping: Follow-up Information     Follow up With Specialties Details Why RANDI Kumar MD Internal Medicine Schedule an appointment as soon as possible for a visit in 5 days  [address] [place] [address]  [phone]            Time spent on discharge:   I spent greater than 30 minutes on discharge, seeing and examining the patient, reconciling home meds and new meds, coordinating care with case management, doing the discharge papers and the D/C summary    Discharge disposition: home    Discharge Condition: Stable    Summary of admission H+P(copied from Dr Kirt Goldberg Note):     CHIEF COMPLAINT: Nausea, vomiting     HISTORY OF PRESENT ILLNESS:     Lyudmila Chin y. o. female with PMHx  for morbid obesity, hypertension, rheumatoid arthritis  presents to the ED with cc of abdominal pain since Friday with intermittent nausea and vomiting as well as increased urinary urgency and frequency.    6 days ago, she started having fever chills and urinary frequency and 2 days ago, Nausea and vomiting. No prior history of UTI  Patient denies any vaginal discharge, chest pain, shortness of breath, headache, rash, diarrhea, sweating or weight loss.   We were asked to admit for work up and evaluation of the above problems.           Past Medical History:   Diagnosis Date    Arthritis       RA    Autoimmune disease ([facility]       RA    Diabetes ([facility]      Hypertension       CONTOLLED BY MEDS    Other ill-defined conditions(799.89)       abscesses under the right arm        CT abdomen/pelvis read by radiology FINDINGS:   LUNG BASES: Minimal bibasilar atelectasis or scar.  INCIDENTALLY IMAGED HEART AND MEDIASTINUM: Unremarkable. LIVER: No mass or biliary dilatation. GALLBLADDER: Unremarkable. SPLEEN: No mass. PANCREAS: No mass or ductal dilatation. ADRENALS: Unremarkable. KIDNEYS/URETERS: No mass, calculus, or hydronephrosis. However, there is  stranding of perinephric fat bilaterally and symmetrically. STOMACH: Gastric band incidentally noted around the gastric fundus. Otherwise  unremarkable. SMALL BOWEL: No dilatation or wall thickening. COLON: No dilatation or wall thickening. APPENDIX: Unremarkable. PERITONEUM: No ascites or pneumoperitoneum. RETROPERITONEUM: No lymphadenopathy or aortic aneurysm. REPRODUCTIVE ORGANS: Not well seen but unremarkable for age. URINARY BLADDER: Urinary bladder is not well distended and image quality is  degraded by left hip artifact. No obvious abnormality is confirmed. BONES: No destructive bone lesion. Left hip arthroplasty creating significant  artifact at the level of the pelvis. Patchy osteopenia. Multilevel degenerative  change. ADDITIONAL COMMENTS: N/A  IMPRESSION:  1. Perinephric stranding of fat bilaterally, of uncertain significance, with no  hydroureteronephrosis or urolithiasis. No parenchymal mass or infection without  intravenous contrast. Correlate with clinical history, physical examination and  urinalysis. 2.  Other incidental and postoperative changes.     Hospital course:     Acute Pyelonephritis POA  Leukocytosis POA WBC 14.9, no SIRS criteria  Immunosuppressed on simponi and methotrexate  Ceftriaxone IV day 4  Urine Culture with log 5 E coli  BC negative  Immunosuppressed on simponi, will hold next infusion  IVF  WBC improved and then increased on day of discharge      Clinically feels well, HD stable, HR stable      Asking to go home, feels ready      Loose stools last night, none today  No new back or abdominal pain, N/V, CP, cough  IV ceftriaxone before discharge  Will check temps at home, call MD if temps > 101 or persistent watery diarrhea  We discussed risks of worsening, she feels ready to home and really wants to go  D/C to home  I gave her my cell phone     Acute kidney injury POA admit BUN/creat 50/2.17  Hypokalemia  Hyponatremia  Nausea/Vomting POA resolved  IVF, creatinine improved to 1.46  Off diuretics, ARB  Additional KCL   CT abdomen/pelvis without hydronephrosis  Hold nephrotoxins, hyzaar  Na improved to 139     RA POA  Started on Simponi dec 2018  Hold methotrexate, resume as outpatient  Resume tramadol     DM type 2 POA  Lantus in house  , 145, 145, 129         107, 166  Resume glyburide ay discharge  Hold Janumet till PCp follow up   SSI     Essential hypertension POA  atenolol  Lower dose norvasc    Obesity POA Body mass index is 40.61 kg/m².     Code Status: Full Code    Surrogate Decision Maker: Daughter KANIKA      DVT Prophylaxis: Lovenox  GI Prophylaxis: not indicated     Subjective:     Chief Complaint / Reason for Physician Visit  \"I really want to go home today\". Discussed with RN events overnight. No further N/V, positive BM last night, diarrhea, none overnight or today, no abdominal pain, N/V  Feels much better than admit, feels she is ready to go and actively lobbying to go home        \"I do not want to stay here another night\"  Denies flank pain, HA, CP or SOB  Low grade fever overnight, trending down    Review of Systems:  Symptom Y/N Comments  Symptom Y/N Comments   Fever/Chills n   Chest Pain n    Poor Appetite    Edema     Cough n   Abdominal Pain n    Sputum    Joint Pain     SOB/SHULTZ n   Headache     Nausea/vomit n r  Tolerating PT/OT     Diarrhea n   Tolerating Diet y    Constipation    Other       Could NOT obtain due to:      Objective:     VITALS:   Last 24hrs VS reviewed since prior progress note.  Most recent are:  Patient Vitals for the past 24 hrs:   Temp Pulse Resp BP SpO2   06/23/19 1025 98.5 °F (36.9 °C) 66 18 132/44 93 %   06/23/19 0745 98.8 °F (37.1 °C) 66 17 107/66 99 %   06/23/19 0245 98.7 °F (37.1 °C) 65 16 148/86 99 %   06/23/19 0054 100.4 °F (38 °C) 67 18 157/53 100 %   06/22/19 2022 98.4 °F (36.9 °C) 65 18 167/63 97 %   06/22/19 1704 97.9 °F (36.6 °C) 66 18 (!) 146/95 99 %   06/22/19 1547 98.1 °F (36.7 °C)           Intake/Output Summary (Last 24 hours) at 6/23/2019 1459  Last data filed at 6/23/2019 0658  Gross per 24 hour   Intake 1740 ml   Output    Net 1740 ml        Wt Readings from Last 12 Encounters:   06/20/19 124.7 kg (275 lb)   07/03/18 63.5 kg (139 lb 15.9 oz)   06/21/18 63.5 kg (140 lb)   06/06/18 117.9 kg (260 lb)   12/18/17 121.9 kg (268 lb 11.9 oz)   08/17/17 124.8 kg (275 lb 2.2 oz)   02/03/17 129 kg (284 lb 6.3 oz)   07/19/15 130.6 kg (287 lb 14.7 oz)   01/23/14 120.2 kg (265 lb)   06/10/13 122.5 kg (270 lb 1 oz)   06/08/13 125.1 kg (275 lb 12.7 oz)   04/12/13 129.3 kg (285 lb)       PHYSICAL EXAM:  General: WD, WN. Alert, cooperative, no acute distress    EENT:  PERRL. Anicteric sclerae. MMM  Neck:  No meningismus, no thyromegaly  Resp:  CTA bilaterally, no wheezing or rales. No accessory muscle use  CV:  Regular  rhythm,  No edema  GI:  Soft, Non distended, Non tender.  +Bowel sounds, no rebound  Neurologic:  Alert and oriented X 3, normal speech, non focal motor exam  Psych:   Good insight. Not anxious nor agitated  Skin:  No rashes. No jaundice    Reviewed most current lab test results and cultures  YES  Reviewed most current radiology test results   YES  Review and summation of old records today    NO  Reviewed patient's current orders and MAR    YES  PMH/SH reviewed - no change compared to H&P  ________________________________________________________________________  Care Plan discussed with:    Comments   Patient x    Family      RN x    Care Manager     Consultant                        Multidiciplinary team rounds were held today with , nursing, pharmacist and clinical coordinator.   Patient's plan of care was discussed; medications were reviewed and discharge planning was addressed. ________________________________________________________________________      Comments   >50% of visit spent in counseling and coordination of care     ________________________________________________________________________  Naveen Badillo MD     Procedures: see electronic medical records for all procedures/Xrays and details which were not copied into this note but were reviewed prior to creation of Plan. LABS:  I reviewed today's most current labs and imaging studies.   Pertinent labs include:  Recent Labs     06/23/19  0924 06/22/19  0356 06/21/19  0313   WBC 15.4* 9.7 12.1*   HGB 8.7* 8.5* 9.7*   HCT 25.6* 24.4* 27.4*    191 146*     Recent Labs     06/23/19  0924 06/22/19  0356 06/21/19  0313 06/20/19  1531    132* 130* 126*   K 3.4* 3.4* 3.2* 2.6*   * 104 96* 94*   CO2 19* 20* 23 22   * 141* 232* 244*   BUN 29* 44* 54* 49*   CREA 1.46* 1.79* 2.31* 2.04*   CA 7.9* 8.0* 8.4* 7.8*   MG  --  2.0  --  2.0   ALB 2.2* 2.1* 2.4* 2.3*   TBILI 0.5 0.7 1.2* 1.3*   SGOT 46* 88* 27 21   ALT 45 49 20 15

## 2019-06-23 NOTE — PROGRESS NOTES
Bedside shift change report given to Liam Christine (oncoming nurse) by Preeti Pugh RN   (offgoing nurse). Report included the following information SBAR, Kardex, Intake/Output, MAR and Recent Results.

## 2019-06-24 LAB
OSMOLALITY SERPL: 330 MOSM/KG H2O
OSMOLALITY UR: 327 MOSM/KG H2O

## 2019-06-26 LAB
BACTERIA SPEC CULT: ABNORMAL
BACTERIA SPEC CULT: ABNORMAL
SERVICE CMNT-IMP: ABNORMAL

## 2022-03-18 PROBLEM — E87.6 HYPOKALEMIA: Status: ACTIVE | Noted: 2019-06-20

## 2022-03-18 PROBLEM — M16.12 OSTEOARTHRITIS OF LEFT HIP: Status: ACTIVE | Noted: 2018-07-02

## 2022-03-18 PROBLEM — N12 PYELONEPHRITIS: Status: ACTIVE | Noted: 2019-06-20

## 2022-03-19 PROBLEM — E87.1 HYPONATREMIA: Status: ACTIVE | Noted: 2019-06-20

## 2022-03-19 PROBLEM — R11.10 VOMITING: Status: ACTIVE | Noted: 2019-06-20

## 2023-05-30 ENCOUNTER — HOSPITAL ENCOUNTER (OUTPATIENT)
Dept: LAB | Age: 58
Setting detail: SPECIMEN
Discharge: HOME | End: 2023-06-02

## 2023-05-30 PROCEDURE — 86735 MUMPS ANTIBODY: CPT

## 2023-05-30 PROCEDURE — 86762 RUBELLA ANTIBODY: CPT

## 2023-05-30 PROCEDURE — 86787 VARICELLA-ZOSTER ANTIBODY: CPT

## 2023-05-30 PROCEDURE — 86765 RUBEOLA ANTIBODY: CPT

## 2024-08-15 NOTE — PROGRESS NOTES
Chronic Pain Consult Note      Plan:     A comprehensive pain management plan may consist of the following: Testing, Therapy, Medications, Interventions, Consults, and Follow up.    Cervical radiculopathy  Scheduled for interlaminar (left) C6-7 CHRIS  Potential series discussed  Increase gabapentin to 600 3 times daily.  Spent time discussing general side effects concerns with this medication.  Initiate oral steroid Medrol Dosepak.  Spent time discussing blood sugars and the need to avoid hypoglycemia with regular blood sugar checks as well as dieting and exercise  Encouraged continued home exercises and stretching  Cervical degenerative disc disease  We will continue to monitor for further interventional therapy if warranted versus surgery  Cervical spondylosis without myelopathy  Possible MBB's pending resolution of radicular pain    General Recommendations: The pain condition that the patient suffers from is best treated with a multidisciplinary approach that involves an increase in physical activity to prevent de-conditioning and worsening of the pain cycle, as well as psychological counseling (formal and/or informal) to address the co morbid psychological effects of pain. Treatment will often involve judicious use of pain medications and interventional procedures to decrease the pain, allowing the patient to participate in the physical activity that will ultimately produce long-lasting pain reductions. The goal of the multidisciplinary approach is to return the patient to a higher level of overall function and to restore their ability to perform activities of daily living.      Referring Provider: Alexander Salvador PA  Assessment:      Chief Complaint: No chief complaint on file.      Laura Chaparro is a 58 y.o. female [facility] the [facility] diagnoses:    Diagnosis:  No diagnosis found.      Subjective:      HPI:  Ms. Chaparro is seen in consultation at the request of [place]

## 2024-08-20 ENCOUNTER — OFFICE VISIT (OUTPATIENT)
Age: 59
End: 2024-08-20
Payer: COMMERCIAL

## 2024-08-20 DIAGNOSIS — M54.12 CERVICAL RADICULOPATHY: Primary | ICD-10-CM

## 2024-08-20 PROCEDURE — 99204 OFFICE O/P NEW MOD 45 MIN: CPT | Performed by: ANESTHESIOLOGY

## 2024-08-20 RX ORDER — ACETAMINOPHEN 500 MG
TABLET ORAL
COMMUNITY

## 2024-08-20 RX ORDER — AMLODIPINE BESYLATE 10 MG/1
1 TABLET ORAL DAILY
COMMUNITY
Start: 2023-05-10

## 2024-08-20 RX ORDER — GOLIMUMAB 50 MG/.5ML
INJECTION, SOLUTION SUBCUTANEOUS
COMMUNITY
Start: 2023-06-08

## 2024-08-20 RX ORDER — GABAPENTIN 300 MG/1
1 CAPSULE ORAL 3 TIMES DAILY
COMMUNITY
Start: 2024-06-20

## 2024-08-20 RX ORDER — ATENOLOL 100 MG/1
1 TABLET ORAL DAILY
COMMUNITY
Start: 2014-05-07

## 2024-08-20 RX ORDER — ATORVASTATIN CALCIUM 40 MG/1
1 TABLET, FILM COATED ORAL DAILY
COMMUNITY
Start: 2024-05-10

## 2024-08-20 RX ORDER — LOSARTAN POTASSIUM 100 MG/1
TABLET ORAL
COMMUNITY
Start: 2023-05-10

## 2024-08-20 RX ORDER — METHYLPREDNISOLONE 4 MG/1
TABLET ORAL
Qty: 21 KIT | Refills: 0 | Status: SHIPPED | OUTPATIENT
Start: 2024-08-20

## 2024-08-20 RX ORDER — UPADACITINIB 15 MG/1
1 TABLET, EXTENDED RELEASE ORAL DAILY
COMMUNITY
Start: 2024-04-01

## 2024-08-20 RX ORDER — GABAPENTIN 600 MG/1
600 TABLET ORAL 3 TIMES DAILY
Qty: 90 TABLET | Refills: 1 | Status: SHIPPED | OUTPATIENT
Start: 2024-08-20 | End: 2024-10-19

## 2024-08-20 RX ORDER — GLIPIZIDE 2.5 MG/1
TABLET, EXTENDED RELEASE ORAL
COMMUNITY
Start: 2024-07-22

## 2024-08-20 RX ORDER — METFORMIN HYDROCHLORIDE 500 MG/1
TABLET, EXTENDED RELEASE ORAL
COMMUNITY
Start: 2024-03-05

## 2024-08-20 NOTE — PATIENT INSTRUCTIONS
scheduled procedure time.   If you are diabetic, record your blood sugar in the morning and bring it with you to your procedure.

## 2024-09-25 ENCOUNTER — HOSPITAL ENCOUNTER (EMERGENCY)
Age: 59
Discharge: HOME | End: 2024-09-25
Attending: EMERGENCY MEDICINE
Payer: COMMERCIAL

## 2024-09-25 ENCOUNTER — TELEPHONE (OUTPATIENT)
Dept: ORTHOPEDIC SURGERY | Age: 59
End: 2024-09-25

## 2024-09-25 VITALS
BODY MASS INDEX: 39.58 KG/M2 | SYSTOLIC BLOOD PRESSURE: 149 MMHG | HEIGHT: 70 IN | DIASTOLIC BLOOD PRESSURE: 62 MMHG | TEMPERATURE: 98.4 F | RESPIRATION RATE: 20 BRPM | OXYGEN SATURATION: 100 % | HEART RATE: 70 BPM | WEIGHT: 276.5 LBS

## 2024-09-25 DIAGNOSIS — G89.29 CHRONIC LEFT SHOULDER PAIN: Primary | ICD-10-CM

## 2024-09-25 DIAGNOSIS — M25.512 CHRONIC LEFT SHOULDER PAIN: Primary | ICD-10-CM

## 2024-09-25 DIAGNOSIS — M54.2 CERVICAL PAIN: ICD-10-CM

## 2024-09-25 PROCEDURE — 99284 EMERGENCY DEPT VISIT MOD MDM: CPT

## 2024-09-25 PROCEDURE — 96372 THER/PROPH/DIAG INJ SC/IM: CPT

## 2024-09-25 PROCEDURE — 6360000002 HC RX W HCPCS: Performed by: EMERGENCY MEDICINE

## 2024-09-25 RX ORDER — KETOROLAC TROMETHAMINE 30 MG/ML
30 INJECTION, SOLUTION INTRAMUSCULAR; INTRAVENOUS
Status: COMPLETED | OUTPATIENT
Start: 2024-09-25 | End: 2024-09-25

## 2024-09-25 RX ORDER — PREDNISONE 20 MG/1
20 TABLET ORAL 2 TIMES DAILY
Qty: 10 TABLET | Refills: 0 | Status: SHIPPED | OUTPATIENT
Start: 2024-09-25 | End: 2024-09-30

## 2024-09-25 RX ADMIN — KETOROLAC TROMETHAMINE 30 MG: 30 INJECTION, SOLUTION INTRAMUSCULAR at 07:41

## 2024-09-25 ASSESSMENT — PAIN SCALES - GENERAL
PAINLEVEL_OUTOF10: 9
PAINLEVEL_OUTOF10: 8

## 2024-09-25 ASSESSMENT — LIFESTYLE VARIABLES
HOW OFTEN DO YOU HAVE A DRINK CONTAINING ALCOHOL: NEVER
HOW MANY STANDARD DRINKS CONTAINING ALCOHOL DO YOU HAVE ON A TYPICAL DAY: PATIENT DOES NOT DRINK

## 2024-09-25 ASSESSMENT — PAIN - FUNCTIONAL ASSESSMENT: PAIN_FUNCTIONAL_ASSESSMENT: 0-10

## 2024-09-25 ASSESSMENT — ENCOUNTER SYMPTOMS: BACK PAIN: 1

## 2024-09-25 NOTE — TELEPHONE ENCOUNTER
Is a current patient of Dr Hank toribio. Left a Voice mail on apt line, went to the [facility] ER with cervical pain rad into lt shoulder, would like to f/u or talk with someone

## 2024-10-23 NOTE — PROGRESS NOTES
Procedure Date: 2024      Location: [facility]       Procedure: interlaminar (left) C6-7 CHRIS        Time Out performed prior to start of the procedure:       Moises Mckinney M.D.  performed the following reviews on Laura Chaparro 1965 prior to the start of the procedure:       patient was identified by name and     agreement on procedure being performed was verified   risks and benefits explained to patient by the provider  procedure site verified as Left  patient was positioned for comfort   consent signed and verified for procedure       Time:  8:54 AM        Procedure performed by:   Moises Mckinney M.D.       Patient assisted by:   GABINO BENNETT MA

## 2024-10-24 ENCOUNTER — OFFICE VISIT (OUTPATIENT)
Dept: ORTHOPEDIC SURGERY | Age: 59
End: 2024-10-24
Payer: COMMERCIAL

## 2024-10-24 DIAGNOSIS — M54.12 RADICULOPATHY OF CERVICAL SPINE: Primary | ICD-10-CM

## 2024-10-24 PROCEDURE — 62321 NJX INTERLAMINAR CRV/THRC: CPT | Performed by: ANESTHESIOLOGY

## 2024-10-24 RX ORDER — BETAMETHASONE SODIUM PHOSPHATE AND BETAMETHASONE ACETATE 3; 3 MG/ML; MG/ML
30 INJECTION, SUSPENSION INTRA-ARTICULAR; INTRALESIONAL; INTRAMUSCULAR; SOFT TISSUE ONCE
Status: COMPLETED | OUTPATIENT
Start: 2024-10-24 | End: 2024-10-24

## 2024-10-24 RX ADMIN — BETAMETHASONE SODIUM PHOSPHATE AND BETAMETHASONE ACETATE 30 MG: 3; 3 INJECTION, SUSPENSION INTRA-ARTICULAR; INTRALESIONAL; INTRAMUSCULAR; SOFT TISSUE at 07:41

## 2024-10-24 NOTE — PROGRESS NOTES
NAME: Laura Chaparro     ID:[id number]     :1965    Location: [facility]    Procedure: Cerivcal Epidural Steroid Injection Under Fluoroscopic Imaging    Pre-op Diagnosis: 1. Cervical Degenerative Disc Disease. 2. Cervical Radicular Pain    Post-op Diagnosis: Same    Anesthesia: Local only     Complications: None    After confirming written and informed consent and discussing the risk, benefits and alternatives for the procedure, the patient had the correct site marked by the physician performing the procedure. The specific risks of bleeding and infection were discussed. The patient was taken to the fluoroscopy suite.    The patient was then placed in the prone position. A pulse oximeter was placed, and verbal and visual monitoring were maintained throughout the procedure. The skin overlying the cervical and thoracic spine was then prepped with chlorhexidine gluconate and a sterile drape was aced. Appropriate sterile attire was worn,  including sterile gloves. A time out was then performed involving the physician, radiation technologist and the patient.    Fluoroscopy was then used to identify the anatomy of the cervical and thoracic spine. The skin overlying the C6/7 interspace was then anesthetized with 3 ml of 1% lidocaine using a 25G 1.5 inch needle. Next, an 20 G 9 cm Tuohy needle was then advanced through the skin, underlying subcutaneous fat and into the supraspinous ligament using intermittent fluoroscopy. After contacting ligament, a contralateral oblique view of the interspace was obtained with fluoroscopy to determine the depth of the Tuohy needle. Proper medial-lateral location of the needle was obtained using anterior-posterior fluoroscopic views. A loss of resistance to air technique was then used to traverse the ligamentum flavum into the epidural space. 2 ml of Omnipaque-240 was then used to confirm the location of the needle tip in the epidural space, excluding vascular or intrathecal placement

## 2024-11-14 ENCOUNTER — OFFICE VISIT (OUTPATIENT)
Age: 59
End: 2024-11-14
Payer: COMMERCIAL

## 2024-11-14 DIAGNOSIS — F40.240 CLAUSTROPHOBIA: ICD-10-CM

## 2024-11-14 DIAGNOSIS — M54.12 CERVICAL RADICULOPATHY: Primary | ICD-10-CM

## 2024-11-14 DIAGNOSIS — M06.9 RHEUMATOID ARTHRITIS INVOLVING BOTH KNEES, UNSPECIFIED WHETHER RHEUMATOID FACTOR PRESENT (HCC): ICD-10-CM

## 2024-11-14 PROCEDURE — 99214 OFFICE O/P EST MOD 30 MIN: CPT | Performed by: PHYSICIAN ASSISTANT

## 2024-11-14 RX ORDER — PREGABALIN 50 MG/1
50 CAPSULE ORAL 2 TIMES DAILY
Qty: 60 CAPSULE | Refills: 1 | Status: SHIPPED | OUTPATIENT
Start: 2024-11-14 | End: 2025-01-13

## 2024-11-14 RX ORDER — DIAZEPAM 5 MG/1
TABLET ORAL
Qty: 2 TABLET | Refills: 0 | Status: SHIPPED | OUTPATIENT
Start: 2024-11-14 | End: 2024-12-13

## 2024-11-14 RX ORDER — TIZANIDINE 2 MG/1
2-4 TABLET ORAL
Qty: 60 TABLET | Refills: 1 | Status: SHIPPED | OUTPATIENT
Start: 2024-11-14 | End: 2025-01-13

## 2024-11-14 ASSESSMENT — ENCOUNTER SYMPTOMS
ALLERGIC/IMMUNOLOGIC NEGATIVE: 1
SHORTNESS OF BREATH: 0
ABDOMINAL PAIN: 0
EYES NEGATIVE: 1

## 2024-11-14 NOTE — PROGRESS NOTES
Ms Chaparro is a f/u for a cervical CHRIS at C6/7. We also increased her Gabapentin to 600mg three times daily.

## 2024-11-14 NOTE — PROGRESS NOTES
Chronic Pain Consult Note      Plan:     A comprehensive pain management plan may consist of the following: Testing, Therapy, Medications, Interventions, Consults, and Follow up.    Cervical radiculopathy  S/p IL HCRIS at C6/7 with improved pain x 5 days.   Given short relief, plan for cervical MRI with pre MRI Valium due to claustrophobia. She has completed formal PT and continues to do her home exercises which were given to her this summer by her PCP.   Potential series discussed  Stop Gabapentin d/t ineffectiveness  Start Lyrica 50mg at bedtime x 3-4 days, and increase to BID as tolerated.   Medrol dose pack completed.   Encouraged continued home exercises and stretching  Cervical degenerative disc disease  We will continue to monitor for further interventional therapy if warranted versus surgery  Rheumatoid Arthritis  Patient has not established with rheumatologist down here due to insurance issues.   Rheum panel ordered in anticipation of referral to rheumatology. She was on Rinvoq and Simponi previously for treatment.   Cervical spondylosis without myelopathy  Possible MBB's pending resolution of radicular pain    General Recommendations: The pain condition that the patient suffers from is best treated with a multidisciplinary approach that involves an increase in physical activity to prevent de-conditioning and worsening of the pain cycle, as well as psychological counseling (formal and/or informal) to address the co morbid psychological effects of pain. Treatment will often involve judicious use of pain medications and interventional procedures to decrease the pain, allowing the patient to participate in the physical activity that will ultimately produce long-lasting pain reductions. The goal of the multidisciplinary approach is to return the patient to a higher level of overall function and to restore their ability to perform activities of daily living.      Referring Provider: No ref. provider

## (undated) DEVICE — STERILE POLYISOPRENE POWDER-FREE SURGICAL GLOVES WITH EMOLLIENT COATING: Brand: PROTEXIS

## (undated) DEVICE — TRAY CATH 16F URIN MTR LTX -- CONVERT TO ITEM 363111

## (undated) DEVICE — 3M™ IOBAN™ 2 ANTIMICROBIAL INCISE DRAPE 6651EZ: Brand: IOBAN™ 2

## (undated) DEVICE — Device

## (undated) DEVICE — DEVON™ KNEE AND BODY STRAP 60" X 3" (1.5 M X 7.6 CM): Brand: DEVON

## (undated) DEVICE — DRSG AQUACEL SURG 3.5 X 10IN -- CONVERT TO ITEM 370183

## (undated) DEVICE — COVER,MAYO STAND,STERILE: Brand: MEDLINE

## (undated) DEVICE — INFECTION CONTROL KIT SYS

## (undated) DEVICE — BLADE RMFG SAG LG 19.1X70X1MM --

## (undated) DEVICE — SUTURE VCRL SZ 2-0 L36IN ABSRB UD L40MM CT 1/2 CIR J957H

## (undated) DEVICE — SOLUTION IRRIG 3000ML 0.9% SOD CHL FLX CONT 0797208] ICU MEDICAL INC]

## (undated) DEVICE — SLIM BODY SKIN STAPLER: Brand: APPOSE ULC

## (undated) DEVICE — SUTURE VCRL SZ 2 L54IN ABSRB UD L65MM TP-1 1/2 CIR J880T

## (undated) DEVICE — SYRINGE MED 20ML STD CLR PLAS LUERLOCK TIP N CTRL DISP

## (undated) DEVICE — PAD 05IN BASE 3IN PEAK M DENS CONVOLUTED FOAM

## (undated) DEVICE — CONTAINER,SPECIMEN,3OZ,OR STRL: Brand: MEDLINE

## (undated) DEVICE — SUTURE VCRL SZ 0 L27IN ABSRB UD L36MM CT-1 1/2 CIR J260H

## (undated) DEVICE — PREP SKN PREVAIL 40ML APPL --

## (undated) DEVICE — SCRUB DRY SURG EZ SCRUB BRUSH PREOPERATIVE GRN

## (undated) DEVICE — SOLUTION IRRIG 1000ML H2O STRL BLT

## (undated) DEVICE — REM POLYHESIVE ADULT PATIENT RETURN ELECTRODE: Brand: VALLEYLAB

## (undated) DEVICE — NEEDLE HYPO 18GA L1.5IN PNK S STL HUB POLYPR SHLD REG BVL

## (undated) DEVICE — STERILE POLYISOPRENE POWDER-FREE SURGICAL GLOVES: Brand: PROTEXIS

## (undated) DEVICE — NEEDLE HYPO 21GA L1.5IN INTRAMUSCULAR S STL LATCH BVL UP

## (undated) DEVICE — T4 HOOD

## (undated) DEVICE — HANDPIECE SET WITH BONE CLEANING TIP AND SUCTION TUBE: Brand: INTERPULSE

## (undated) DEVICE — 3000CC GUARDIAN II: Brand: GUARDIAN